# Patient Record
Sex: FEMALE | Race: WHITE | Employment: OTHER | URBAN - METROPOLITAN AREA
[De-identification: names, ages, dates, MRNs, and addresses within clinical notes are randomized per-mention and may not be internally consistent; named-entity substitution may affect disease eponyms.]

---

## 2017-04-25 ENCOUNTER — OFFICE VISIT (OUTPATIENT)
Dept: CARDIOLOGY CLINIC | Age: 62
End: 2017-04-25

## 2017-04-25 VITALS
BODY MASS INDEX: 33.65 KG/M2 | DIASTOLIC BLOOD PRESSURE: 100 MMHG | SYSTOLIC BLOOD PRESSURE: 175 MMHG | HEART RATE: 67 BPM | HEIGHT: 66 IN | WEIGHT: 209.4 LBS | OXYGEN SATURATION: 97 %

## 2017-04-25 DIAGNOSIS — G47.33 OSA (OBSTRUCTIVE SLEEP APNEA): Chronic | ICD-10-CM

## 2017-04-25 DIAGNOSIS — E78.00 PURE HYPERCHOLESTEROLEMIA: Chronic | ICD-10-CM

## 2017-04-25 DIAGNOSIS — I10 ESSENTIAL HYPERTENSION, BENIGN: ICD-10-CM

## 2017-04-25 DIAGNOSIS — I48.0 PAROXYSMAL ATRIAL FIBRILLATION (HCC): ICD-10-CM

## 2017-04-25 DIAGNOSIS — Z01.810 PREOP CARDIOVASCULAR EXAM: Primary | ICD-10-CM

## 2017-04-25 PROCEDURE — 99215 OFFICE O/P EST HI 40 MIN: CPT | Performed by: INTERNAL MEDICINE

## 2017-04-25 PROCEDURE — 93000 ELECTROCARDIOGRAM COMPLETE: CPT | Performed by: INTERNAL MEDICINE

## 2017-04-25 RX ORDER — CLOTRIMAZOLE AND BETAMETHASONE DIPROPIONATE 10; .64 MG/G; MG/G
CREAM TOPICAL 2 TIMES DAILY
COMMUNITY

## 2017-04-25 RX ORDER — LISINOPRIL 10 MG/1
10 TABLET ORAL DAILY
Qty: 30 TABLET | Refills: 6 | Status: SHIPPED | OUTPATIENT
Start: 2017-04-25 | End: 2017-10-17 | Stop reason: DRUGHIGH

## 2017-04-25 RX ORDER — ROPINIROLE 0.5 MG/1
0.5 TABLET, FILM COATED ORAL EVERY EVENING
COMMUNITY

## 2017-04-25 RX ORDER — CARVEDILOL 12.5 MG/1
12.5 TABLET ORAL 2 TIMES DAILY WITH MEALS
Qty: 60 TABLET | Refills: 6 | Status: SHIPPED | OUTPATIENT
Start: 2017-04-25 | End: 2017-10-12 | Stop reason: SDUPTHER

## 2017-04-25 RX ORDER — CARVEDILOL 6.25 MG/1
6.25 TABLET ORAL 2 TIMES DAILY WITH MEALS
COMMUNITY
End: 2017-04-25 | Stop reason: SDUPTHER

## 2017-04-25 ASSESSMENT — ENCOUNTER SYMPTOMS
CHEST TIGHTNESS: 0
EYE PAIN: 0
ABDOMINAL PAIN: 0
SHORTNESS OF BREATH: 1
COLOR CHANGE: 0
EYE REDNESS: 0
COUGH: 0
WHEEZING: 0
BLOOD IN STOOL: 0

## 2017-09-25 ENCOUNTER — TELEPHONE (OUTPATIENT)
Dept: CARDIOLOGY CLINIC | Age: 62
End: 2017-09-25

## 2017-09-25 DIAGNOSIS — I48.0 PAROXYSMAL ATRIAL FIBRILLATION (HCC): Primary | ICD-10-CM

## 2017-10-12 RX ORDER — CARVEDILOL 25 MG/1
25 TABLET ORAL 2 TIMES DAILY WITH MEALS
Qty: 60 TABLET | Refills: 6 | Status: SHIPPED | OUTPATIENT
Start: 2017-10-12 | End: 2017-10-17 | Stop reason: DRUGHIGH

## 2017-10-17 ENCOUNTER — OFFICE VISIT (OUTPATIENT)
Dept: CARDIOLOGY CLINIC | Age: 62
End: 2017-10-17

## 2017-10-17 VITALS
WEIGHT: 207.8 LBS | DIASTOLIC BLOOD PRESSURE: 80 MMHG | BODY MASS INDEX: 33.4 KG/M2 | HEIGHT: 66 IN | HEART RATE: 88 BPM | SYSTOLIC BLOOD PRESSURE: 140 MMHG | OXYGEN SATURATION: 97 %

## 2017-10-17 DIAGNOSIS — I48.0 PAROXYSMAL ATRIAL FIBRILLATION (HCC): ICD-10-CM

## 2017-10-17 DIAGNOSIS — I42.1 HOCM (HYPERTROPHIC OBSTRUCTIVE CARDIOMYOPATHY) (HCC): Primary | ICD-10-CM

## 2017-10-17 DIAGNOSIS — I25.9 CHEST PAIN DUE TO MYOCARDIAL ISCHEMIA, UNSPECIFIED ISCHEMIC CHEST PAIN TYPE: ICD-10-CM

## 2017-10-17 DIAGNOSIS — I10 ESSENTIAL HYPERTENSION, BENIGN: ICD-10-CM

## 2017-10-17 DIAGNOSIS — R06.09 DYSPNEA ON EXERTION: ICD-10-CM

## 2017-10-17 DIAGNOSIS — F17.200 TOBACCO USE DISORDER: ICD-10-CM

## 2017-10-17 PROCEDURE — 99215 OFFICE O/P EST HI 40 MIN: CPT | Performed by: INTERNAL MEDICINE

## 2017-10-17 RX ORDER — CARVEDILOL 25 MG/1
25 TABLET ORAL 2 TIMES DAILY WITH MEALS
Qty: 60 TABLET | Refills: 6 | Status: SHIPPED
Start: 2017-10-17 | End: 2018-06-13 | Stop reason: ALTCHOICE

## 2017-10-17 RX ORDER — LISINOPRIL 5 MG/1
5 TABLET ORAL DAILY
Qty: 30 TABLET | Refills: 6 | Status: SHIPPED
Start: 2017-10-17 | End: 2018-06-13 | Stop reason: DRUGHIGH

## 2017-10-17 ASSESSMENT — ENCOUNTER SYMPTOMS
COLOR CHANGE: 0
EYE REDNESS: 0
BLOOD IN STOOL: 0
EYE PAIN: 0
ABDOMINAL PAIN: 0
CHEST TIGHTNESS: 0
COUGH: 0
SHORTNESS OF BREATH: 1
WHEEZING: 0

## 2017-10-17 NOTE — PATIENT INSTRUCTIONS
Patient Education        Atrial Fibrillation: Care Instructions  Your Care Instructions    Atrial fibrillation is an irregular and often fast heartbeat. Treating this condition is important for several reasons. It can cause blood clots, which can travel from your heart to your brain and cause a stroke. If you have a fast heartbeat, you may feel lightheaded, dizzy, and weak. An irregular heartbeat can also increase your risk for heart failure. Atrial fibrillation is often the result of another heart condition, such as high blood pressure or coronary artery disease. Making changes to improve your heart condition will help you stay healthy and active. Follow-up care is a key part of your treatment and safety. Be sure to make and go to all appointments, and call your doctor if you are having problems. It's also a good idea to know your test results and keep a list of the medicines you take. How can you care for yourself at home? Medicines  · Take your medicines exactly as prescribed. Call your doctor if you think you are having a problem with your medicine. You will get more details on the specific medicines your doctor prescribes. · If your doctor has given you a blood thinner to prevent a stroke, be sure you get instructions about how to take your medicine safely. Blood thinners can cause serious bleeding problems. · Do not take any vitamins, over-the-counter drugs, or herbal products without talking to your doctor first.  Lifestyle changes  · Do not smoke. Smoking can increase your chance of a stroke and heart attack. If you need help quitting, talk to your doctor about stop-smoking programs and medicines. These can increase your chances of quitting for good. · Eat a heart-healthy diet. · Stay at a healthy weight. Lose weight if you need to. · Limit alcohol to 2 drinks a day for men and 1 drink a day for women. Too much alcohol can cause health problems. · Avoid colds and flu.  Get a pneumococcal vaccine shot. If you have had one before, ask your doctor whether you need another dose. Get a flu shot every year. If you must be around people with colds or flu, wash your hands often. Activity  · If your doctor recommends it, get more exercise. Walking is a good choice. Bit by bit, increase the amount you walk every day. Try for at least 30 minutes on most days of the week. You also may want to swim, bike, or do other activities. Your doctor may suggest that you join a cardiac rehabilitation program so that you can have help increasing your physical activity safely. · Start light exercise if your doctor says it is okay. Even a small amount will help you get stronger, have more energy, and manage stress. Walking is an easy way to get exercise. Start out by walking a little more than you did in the hospital. Gradually increase the amount you walk. · When you exercise, watch for signs that your heart is working too hard. You are pushing too hard if you cannot talk while you are exercising. If you become short of breath or dizzy or have chest pain, sit down and rest immediately. · Check your pulse regularly. Place two fingers on the artery at the palm side of your wrist, in line with your thumb. If your heartbeat seems uneven or fast, talk to your doctor. When should you call for help? Call 911 anytime you think you may need emergency care. For example, call if:  · You have symptoms of a heart attack. These may include:  ¨ Chest pain or pressure, or a strange feeling in the chest.  ¨ Sweating. ¨ Shortness of breath. ¨ Nausea or vomiting. ¨ Pain, pressure, or a strange feeling in the back, neck, jaw, or upper belly or in one or both shoulders or arms. ¨ Lightheadedness or sudden weakness. ¨ A fast or irregular heartbeat. After you call 911, the  may tell you to chew 1 adult-strength or 2 to 4 low-dose aspirin. Wait for an ambulance. Do not try to drive yourself. · You have symptoms of a stroke.  These may include:  ¨ Sudden numbness, tingling, weakness, or loss of movement in your face, arm, or leg, especially on only one side of your body. ¨ Sudden vision changes. ¨ Sudden trouble speaking. ¨ Sudden confusion or trouble understanding simple statements. ¨ Sudden problems with walking or balance. ¨ A sudden, severe headache that is different from past headaches. · You passed out (lost consciousness). Call your doctor now or seek immediate medical care if:  · You have new or increased shortness of breath. · You feel dizzy or lightheaded, or you feel like you may faint. · Your heart rate becomes irregular. · You can feel your heart flutter in your chest or skip heartbeats. Tell your doctor if these symptoms are new or worse. Watch closely for changes in your health, and be sure to contact your doctor if you have any problems. Where can you learn more? Go to [email or web address] org and sign in to your [employer] account. Enter U020 in the [employer] box to learn more about \"Atrial Fibrillation: Care Instructions. \"     If you do not have an account, please click on the \"Sign Up Now\" link. Current as of: September 21, 2016  Content Version: 11.3  © 9768-4202 [email or web address]. Care instructions adapted under license by [facility] [address] If you have questions about a medical condition or this instruction, always ask your healthcare professional. [place] [address] any warranty or liability for your use of this information.        Patient Education

## 2017-10-17 NOTE — PROGRESS NOTES
N/A    Number of children: N/A    Years of education: N/A     Social History Main Topics    Smoking status: Current Every Day Smoker     Packs/day: 0.50     Years: 40.00    Smokeless tobacco: Never Used    Alcohol use 2.4 oz/week     1 Standard drinks or equivalent, 3 Shots of liquor per week      Comment: minimal  2-3 glasses per week.  Drug use: No    Sexual activity: Not Asked     Other Topics Concern    None     Social History Narrative    None     Past Surgical History:   Procedure Laterality Date    CARDIAC CATHETERIZATION      CARDIOVERSION      HAND SURGERY      HYSTERECTOMY       Allergies   Allergen Reactions    Percocet [Oxycodone-Acetaminophen]      Family History   Problem Relation Age of Onset    Kidney Disease Mother     Heart Disease Father     Kidney Disease Father      Recent labs and imaging reviewed. Assessment:       IHSS (idiopathic hypertrophic subaortic stenosis)  Patent coronaries in 2006. No hx of sudden death in family      Echo 5/2015 HOCM. LVEF 55-60%, no gradient measured. Consider myomectomy. Echo 12/2016 LVH, LVEF 55-60%, LAE, grade III diastolic dysfx, mod MR, no mention of HOCM.  Tobacco abuse      Quit 10/2011, starting smoking again.  Palpitations - Event monitor NSR 4/2012. HM  10/2017 Atrial fib with RVR      stable    Chest pain- atypical      patent coronaries 06. Nuc GXT 4/12 Normal     VARUN (obstructive sleep apnea)      on CPAP.  Mitral insufficiency       Echo 12/2016 moderate MR    HYPERTENSION  Metanephrine 128  (cough on ACE). controlled     COPD (chronic obstructive pulmonary disease)  PFT'S 11/11-Moderate restrictive defect          VELASQUEZ, fatigue/ palpitations- secondary to atrial fib       Atrial fibrillation  CHADS VASC 2. On Xarelto. S/p CV 3/2014. EKG 10/23/14 Atrial fib with slow ventricular response, prolonged QT, LVH with repolarization changes. Referred to Dr. Catarina Jo who recommended CV. S/P CV 10/2014.   EKG 11/4/15 NSR, LVH. Pt preferred to stop amiodarone. 48 hour HM atrial fib with average HR 83, max , minimum HR 51- pt reported SOB and dizziness. Leg swelling- ?secondary to norvasc. Plan:      Atrial fib currently controlled. Test results and treatment options discussed. Will refer to EP for consideration of ablation. Will increase coreg to 25mg bid and reduce lisinopril to 5mg daily due to dizziness. Recommend echocardiogram to evaluate progression of HOCM. Recommend lexiscan myoview stress test to evaluate chest pain and exclude ischemia.

## 2017-10-17 NOTE — LETTER
[address] Cardiology - [address] [address]  [address] [place] [address]  [address] [address]  Phone: [phone]  Fax: [phone]    Leticia Merlos MD        November 2, 2017     Amber Hernandez  [id number]    Patient: Rolando Arroyo  MR Number: [id number]  YOB: 1955  Date of Visit: 10/17/2017    Dear Dr. Amber Hernandez:    HPI . Rolando Arroyo denies  palpitations, dizziness, syncope, leg swelling and cough. She states that she is SOB at times. She also had an episode of left sided chest pain yesterday that lasted 30 minutes. She reduced her coreg due to dizziness. Assessment:       IHSS (idiopathic hypertrophic subaortic stenosis)  Patent coronaries in 2006. No hx of sudden death in family      Echo 5/2015 HOCM. LVEF 55-60%, no gradient measured. Consider myomectomy. Echo 12/2016 LVH, LVEF 55-60%, LAE, grade III diastolic dysfx, mod MR, no mention of HOCM.  Tobacco abuse      Quit 10/2011, starting smoking again.  Palpitations - Event monitor NSR 4/2012. HM  10/2017 Atrial fib with RVR      stable    Chest pain- atypical      patent coronaries 06. Nuc GXT 4/12 Normal     VARUN (obstructive sleep apnea)      on CPAP.  Mitral insufficiency       Echo 12/2016 moderate MR    HYPERTENSION  Metanephrine 128  (cough on ACE). controlled     COPD (chronic obstructive pulmonary disease)  PFT'S 11/11-Moderate restrictive defect          VELASQUEZ, fatigue/ palpitations- secondary to atrial fib       Atrial fibrillation  CHADS VASC 2. On Xarelto. S/p CV 3/2014. EKG 10/23/14 Atrial fib with slow ventricular response, prolonged QT, LVH with repolarization changes. Referred to Dr. Ez Amezquita who recommended CV. S/P CV 10/2014. EKG 11/4/15 NSR, LVH. Pt preferred to stop amiodarone. 48 hour HM atrial fib with average HR 83, max , minimum HR 51- pt reported SOB and dizziness. Leg swelling- ?secondary to norvasc.            Plan: Atrial fib currently controlled. Test results and treatment options discussed. Will refer to EP for consideration of ablation. Will increase coreg to 25mg bid and reduce lisinopril to 5mg daily due to dizziness. Recommend echocardiogram to evaluate progression of HOCM. Recommend lexiscan myoview stress test to evaluate chest pain and exclude ischemia. If you have questions, please do not hesitate to call me. I look forward to following Kandy along with you.     Sincerely,        Balinda Lesch, MD

## 2017-11-01 ENCOUNTER — OFFICE VISIT (OUTPATIENT)
Dept: CARDIOLOGY CLINIC | Age: 62
End: 2017-11-01

## 2017-11-01 VITALS
WEIGHT: 206 LBS | BODY MASS INDEX: 33.11 KG/M2 | HEIGHT: 66 IN | HEART RATE: 97 BPM | OXYGEN SATURATION: 93 % | SYSTOLIC BLOOD PRESSURE: 142 MMHG | DIASTOLIC BLOOD PRESSURE: 84 MMHG

## 2017-11-01 DIAGNOSIS — G47.33 OSA (OBSTRUCTIVE SLEEP APNEA): Chronic | ICD-10-CM

## 2017-11-01 DIAGNOSIS — I48.19 PERSISTENT ATRIAL FIBRILLATION (HCC): Primary | ICD-10-CM

## 2017-11-01 DIAGNOSIS — E78.2 MIXED HYPERLIPIDEMIA: Chronic | ICD-10-CM

## 2017-11-01 DIAGNOSIS — Z87.891 HISTORY OF TOBACCO USE: Chronic | ICD-10-CM

## 2017-11-01 PROCEDURE — 93000 ELECTROCARDIOGRAM COMPLETE: CPT | Performed by: INTERNAL MEDICINE

## 2017-11-01 PROCEDURE — 99214 OFFICE O/P EST MOD 30 MIN: CPT | Performed by: INTERNAL MEDICINE

## 2017-11-01 RX ORDER — AZITHROMYCIN 250 MG/1
250 TABLET, FILM COATED ORAL DAILY
Qty: 7 TABLET | Refills: 0 | Status: SHIPPED | OUTPATIENT
Start: 2017-11-01 | End: 2017-11-08

## 2017-11-01 NOTE — PROGRESS NOTES
[address]   Electrophysiology Consultation   Date: 11/1/2017  Reason for Consultation: Atrial fibrillation   Consult Requesting Physician: Georg Hatchet    CC: atrial fibrillation   HPI: Vanesa Jorge is a 64 y.o. history of HOCM and diastolic dysfunction, persistent atrial fibrillation, COPD, smoker, moderate MR who has been referred for atrial fibrillation ablation. Patient has history of atrial fibrillation and prior cardioversion in 2014 and was started on amiodarone (d/c'd in 5/2016 due to COPD). She reports that she has had 3 DCCV's in the past. She has noted that she has progressively felt worse over the past 3-4 months. She is symptomatic with SOB, fatigue, dizziness and abnormal beats felt predominantly felt in her neck. She has baseline shortness of breath due to her smoking. She likely has undiagnosed COPD and is fighting a bout of bronchitis. She currently works as a  for housekeeping and laundry at [employer]. She admits to a high level of stress surrounding the job. Past Medical History:   Diagnosis Date    A-fib [facility]) 2014    new onset    Cardiomyopathy ([address]     Chest pain     patent coronaries 06    COPD [facility] disease) [facility]     Hypertension     IHSS (idiopathic hypertrophic subaortic stenosis) ([facility])     Echo 6/10 normal LVEF, no mention of IHSS    Mitral insufficiency     echo 6/10 mild-mod MR    VARUN (obstructive sleep apnea)     on CPAP    Palpitations     stable    Tobacco abuse     cessation discussed        Past Surgical History:   Procedure Laterality Date    CARDIAC CATHETERIZATION      CARDIOVERSION      HAND SURGERY      HYSTERECTOMY         Allergies   Allergen Reactions    Percocet [Oxycodone-Acetaminophen]        Social History:   reports that she has been smoking. She has a 20.00 pack-year smoking history. She has never used smokeless tobacco. She reports that she drinks about 2.4 oz of alcohol per week .  She reports that she does not use drugs. Family History:  family history includes Heart Disease in her father; Kidney Disease in her father and mother. Review of System:  All other systems reviewed and are negative except for that noted above. Pertinent negatives are:   General: negative for fever, chills   Ophthalmic ROS: negative for - eye pain or loss of vision  ENT ROS: negative for - headaches, sore throat   Respiratory: negative for - cough, sputum  Cardiovascular: Reviewed in HPI  Gastrointestinal: negative for - abdominal pain, diarrhea, N/V  Hematology: negative for - bleeding, blood clots, bruising or jaundice  Genito-Urinary:  negative for - Dysuria or incontinence  Musculoskeletal: negative for - Joint swelling, muscle pain  Neurological: negative for - confusion, dizziness, headaches   Psychiatric: No anxiety, no depression. Dermatological: negative for - rash    Physical Examination:  Vitals:    11/01/17 1550   BP: (!) 142/84   Pulse:    SpO2:       Wt Readings from Last 3 Encounters:   11/01/17 206 lb (93.4 kg)   10/17/17 207 lb 12.8 oz (94.3 kg)   04/25/17 209 lb 6.4 oz (95 kg)       · Constitutional: Oriented. No distress. · Head: Normocephalic and atraumatic. · Mouth/Throat: Oropharynx is clear and moist.   · Eyes: Conjunctivae normal. EOM are normal.   · Neck: Neck supple. No rigidity. No JVD present. · Cardiovascular: Normal rate, Irregular rhythm, S1&S2. · Pulmonary/Chest: Bilateral respiratory sounds. No wheezes. +rhonchi   · Abdominal: Soft. Bowel sounds present. No distension, No tenderness. · Musculoskeletal: No tenderness. No edema    · Lymphadenopathy: Has no cervical adenopathy. · Neurological: Alert and oriented. Cranial nerve appears intact, No Gross deficit   · Skin: Skin is warm and dry. No rash noted. · Psychiatric: Has a normal behavior       Labs:  Reviewed. Diagnostic and imaging results reviewed.      ECG:  Atrial fibrillation, LVH with strain pattern   Echo:    Echo 5/2015 HOCM. LVEF 55-60%, no gradient measured. Echo 12/2016 LVH, LVEF 55-60%, LAE, grade III diastolic dysfx, mod MR     Cath:   normal coronaries. Medication:  Current Outpatient Prescriptions   Medication Sig Dispense Refill    lisinopril (PRINIVIL;ZESTRIL) 5 MG tablet Take 1 tablet by mouth daily 30 tablet 6    carvedilol (COREG) 25 MG tablet Take 1 tablet by mouth 2 times daily (with meals) 60 tablet 6    rOPINIRole (REQUIP) 0.5 MG tablet Take 0.5 mg by mouth every evening      ferrous sulfate-C-folic acid (FOLITAB) 723-616-3.4 MG TBCR per extended release tablet Take 1 tablet by mouth daily      Flaxseed, Linseed, (FLAX PO) Take by mouth      clotrimazole-betamethasone (LOTRISONE) 1-0.05 % cream Apply topically 2 times daily Apply topically 2 times daily.  cyclobenzaprine (FLEXERIL) 10 MG tablet Take 10 mg by mouth nightly as needed for Muscle spasms      fluticasone (FLONASE) 50 MCG/ACT nasal spray 1 spray by Nasal route daily      albuterol sulfate  (90 BASE) MCG/ACT inhaler Inhale 2 puffs into the lungs every 6 hours as needed for Wheezing      cloNIDine (CATAPRES) 0.2 MG tablet Take 0.2 mg by mouth 2 times daily      rivaroxaban (XARELTO) 20 MG TABS tablet Take 1 tablet by mouth daily 30 tablet 0    Omega-3 Fatty Acids (FISH OIL) 1000 MG CAPS Take 1,000 mg by mouth 2 times daily      traMADol (ULTRAM) 50 MG tablet Take 50 mg by mouth every 6 hours as needed for Pain.  loratadine (CLARITIN) 10 MG tablet   Take 10 mg by mouth as needed       gabapentin (NEURONTIN) 100 MG capsule Take 300 mg by mouth nightly       pantoprazole (PROTONIX) 40 MG tablet Take 40 mg by mouth 2 times daily.  vitamin E 1000 UNIT capsule Take 400 Units by mouth daily. No current facility-administered medications for this visit.         Assessment and plan:     - Symptomatic persistent atrial fibrillation:    - I have discussed treatment options including changing antiarrhythmics, cardioversion, rate control with anticoagulation, and ablation in detail with patient. Risks, benefits and alternatives were explained to patient. - She has COPD and amiodarone is not an good option.    -Hx of 3 DCCV's and amiodarone therapy- stopped due to COPD?   -Symptomatic with dizziness, sob, fatigue, irregular beats     - Atrial fibrillation ablation procedure has been discussed in detail with patient. Risk, benefit, alternative, and rationale of the procedure has been discussed with the patient. Risks associated with procedure include but not limited to allergic reaction to the medications, pain, bleeding, infection, nerve injury, injury to diaphragm(breathing muscle), pulmonary embolus(blood clot in lungs), deep vein blood clot, pneumothorax, hemothorax, acute renal failure, cardiac perforation,  tamponade, need for emergent surgery (open heart), permanent pacemaker, pulmonary vein stenosis, left atrial to esophageal fistula, stroke, myocardial infarction and death. I have explained that ablation therapy is symptoms driven and alternative such as anticoagulation and rate control can be considered. I also explained that atrial fibrillation cannot be cured by ablation. On average patients need two ablation procedures. The success rate with one procedure is about 65-70%. A detailed educational information about ablation has been provided and patient has been encouraged to review information and call back with any questions about risks, benefits and alternative of procedure. Patient verbalized understanding of procedure, risks and benefits and would like to think about procedure. - If she would like to proceed, will schedule for EPS and ablation with [employer] navigation system.     -On Eliquis and BB therapy    -VARUN:    -Compliant with CPAP    -HTN:   -Controlled   -Continue medications    - Tobacco addiction:   -Educated on the importance of cessation.   -Down to 10-12 cigarettes daily    - HOCM: reported by echo.    + Systolic murmur by exam.    - on medical therapy. - Smoker: smoking cessation. Thank you for allowing me to participate in the care of [address] Further evaluation will be based upon the patient's clinical course and testing results. All questions and concerns were addressed to the patient/family. Alternatives to my treatment were discussed. I have discussed the above stated plan and the patient verbalized understanding and agreed with the plan.     Abbe Juloi MD, MPH  [facility]   Office: [phone]

## 2017-11-01 NOTE — PATIENT INSTRUCTIONS
Patient Education        Learning About Catheter Ablation for Heart Rhythm Problems  What is catheter ablation? Catheter ablation is a procedure that treats heart rhythm problems. These problems include atrial fibrillation, supraventricular tachycardia (SVT), atrial flutter, and ventricular tachycardia. Your heart should have a strong, steady beat. That beat is controlled by the heart's electrical system. Sometimes that system misfires. This causes a heartbeat that is too fast and isn't steady. Catheter ablation is a way to get into your heart and fix the problem. Ablation is not surgery. How is catheter ablation done? Your doctor inserts thin tubes called catheters into a blood vessel in your groin, arm, or neck. Then your doctor feeds them into the heart. Wires in the catheters help the doctor find the problem areas. Then he or she uses the wires to send energy to destroy the tiny areas of heart tissue that are causing the problems. It may seem like a bad idea to destroy parts of your heart on purpose. But the areas that are destroyed are very tiny. They should not affect your heart's ability to do its job. You may be awake during the procedure. Or you may be asleep. The doctor will give you medicines to help you feel relaxed and to numb the areas where the catheters go in. You may feel a little uncomfortable, but you should not feel pain. This procedure usually takes 2 to 6 hours. In rare cases, it can take longer. You may stay overnight in the hospital. How long you stay in the hospital depends on the type of ablation you have. What can you expect after catheter ablation? Do not exercise hard or lift anything heavy for a week. You will probably be able to go back to work and to your normal routine in 1 or 2 days. You may have swelling, bruising, or a small lump around the site where the catheters went into your body. These should go away in 3 to 4 weeks.   You may have to take some medicines for a while.  Follow-up care is a key part of your treatment and safety. Be sure to make and go to all appointments, and call your doctor if you are having problems. It's also a good idea to know your test results and keep a list of the medicines you take. Where can you learn more? Go to https://chpepiceweb.Course [email or web address]. org and sign in to your [employer] account. Enter B545 in the [facility] box to learn more about \"Learning About Catheter Ablation for Heart Rhythm Problems. \"     If you do not have an account, please click on the \"Sign Up Now\" link. Current as of: July 28, 2016  Content Version: 11.3  © 4591-7812 [employer]. Care instructions adapted under license by [facility] ([facility]). If you have questions about a medical condition or this instruction, always ask your healthcare professional. [place] [address] any warranty or liability for your use of this information. Patient Education        Electrophysiology Study and Catheter Ablation: Before Your Procedure  What is an electrophysiology study and catheter ablation? An electrophysiology study is a test to see if there is a problem with your heart rhythm and to find out how to fix it. It is also called an EPS. Sometimes a procedure called catheter ablation is done during an EPS. This procedure destroys (ablates) small areas of your heart that are causing your heart rhythm problem. The doctor puts plastic tubes called catheters into blood vessels in your groin, arm, or neck. He or she then uses an X-ray machine to guide long wires through the tubes to your heart. The doctor can use these wires to record your heart's electrical signals. If the doctor thinks your problem can be fixed with ablation, he or she can use the wires to destroy a small part of your heart tissue. This is most often done with radio waves. You will probably be awake during the procedure. But you could be asleep.  The doctor will give you medicines to help you feel relaxed and to numb the areas where the catheters go in. An EPS and ablation can take 2 to 6 hours. In rare cases, it can take longer. If you have EPS only and you do not need more treatment, you may go home the same day. But if you also have ablation, you may stay overnight in the hospital. How long you stay in the hospital depends on the type of ablation you have. Do not exercise hard or lift anything heavy for a week. You may be able to go back to work and to your normal routine in 1 or 2 days. Follow-up care is a key part of your treatment and safety. Be sure to make and go to all appointments, and call your doctor if you are having problems. It's also a good idea to know your test results and keep a list of the medicines you take. What happens before the procedure? Procedures can be stressful. This information will help you understand what you can expect. And it will help you safely prepare for your procedure. Preparing for the procedure  · Understand exactly what procedure is planned, along with the risks, benefits, and other options. · Tell your doctors ALL the medicines, vitamins, supplements, and herbal remedies you take. Some of these can increase the risk of bleeding or interact with anesthesia. · If you take blood thinners, such as warfarin (Coumadin), clopidogrel (Plavix), or aspirin, be sure to talk to your doctor. He or she will tell you if you should stop taking these medicines before your procedure. Make sure that you understand exactly what your doctor wants you to do. · Your doctor will tell you which medicines to take or stop before your procedure. You may need to stop taking certain medicines a week or more before the procedure. So talk to your doctor as soon as you can. · If you have an advance directive, let your doctor know. It may include a living will and a durable power of  for health care.  Bring a copy to the hospital. If you don't have one, make you strain. This may include heavy grocery bags and milk containers, a heavy briefcase or backpack, cat litter or dog food bags, a vacuum , or a child. · For 1 week, do not exercise hard or do any activity that could strain your blood vessels or the site where the catheters went into your body. · Ask your doctor when it is okay to have sex. · You may shower 24 to 48 hours after the procedure, if your doctor okays it. Pat the incision dry. Do not take a bath for 1 week, or until your doctor tells you it is okay. Diet  · You can eat your normal diet. If your stomach is upset, try bland, low-fat foods like plain rice, broiled chicken, toast, and yogurt. · Drink plenty of fluids (unless your doctor tells you not to). Medicines  · Your doctor will tell you if and when you can restart your medicines. He or she will also give you instructions about taking any new medicines. · If you take blood thinners, such as warfarin (Coumadin), clopidogrel (Plavix), or aspirin, be sure to talk to your doctor. He or she will tell you if and when to start taking those medicines again. Make sure that you understand exactly what your doctor wants you to do. · Ask your doctor if you can take acetaminophen (Tylenol) for pain. Do not take aspirin for 3 days, unless your doctor says it is okay. · Check with your doctor before you take aspirin or anti-inflammatory medicines to reduce pain and swelling. These include ibuprofen (Advil, Motrin) and naproxen (Aleve). · Make sure you know which heart medicines to continue and which ones to stop. Ask your doctor if you are not sure. Catheter site care  · You can remove your bandages the day after the procedure. Follow-up care is a key part of your treatment and safety. Be sure to make and go to all appointments, and call your doctor if you are having problems. It's also a good idea to know your test results and keep a list of the medicines you take.   When should you call for help?  Call 911 anytime you think you may need emergency care. For example, call if:  · You passed out (lost consciousness). · You have severe trouble breathing. · You have sudden chest pain and shortness of breath, or you cough up blood. · You have a lot of bleeding from your catheter site. Call your doctor now or seek immediate medical care if:  · You have a fever over 100°F.  · You are bleeding from one of the areas where a catheter was put in.  · You have a fast-growing, painful lump at the catheter site. · You have painful swelling, or bruising larger than a credit card where a catheter was put in.  · You have signs of infection, such as:  ¨ Increased pain, swelling, warmth, or redness. ¨ Red streaks leading from the catheter site. ¨ Pus draining from the catheter site. ¨ A fever. · Your arm or leg is numb or hurts. · Your feet are very cold. · Your heart rhythm problem comes back. · You are dizzy or lightheaded, or you feel like you may faint. Watch closely for any changes in your health, and be sure to contact your doctor if:  · You have questions about the results of your procedure or your treatment plan. Where can you learn more? Go to https://CreditEasepeForeScout Technologieseb.ImageTag. org and sign in to your [employer] account. Enter [phone] in the [facility] box to learn more about \"Electrophysiology Study and Catheter Ablation: What to Expect at Home. \"     If you do not have an account, please click on the \"Sign Up Now\" link. Current as of: April 3, 2017  Content Version: 11.3  © 4417-1359 [employer]. Care instructions adapted under license by [facility] ([facility]). If you have questions about a medical condition or this instruction, always ask your healthcare professional. [place] [address] any warranty or liability for your use of this information.

## 2017-11-02 NOTE — COMMUNICATION BODY
HPI .Edwina Frias denies  palpitations, dizziness, syncope, leg swelling and cough. She states that she is SOB at times. She also had an episode of left sided chest pain yesterday that lasted 30 minutes. She reduced her coreg due to dizziness. Assessment:       IHSS (idiopathic hypertrophic subaortic stenosis)  Patent coronaries in 2006. No hx of sudden death in family      Echo 5/2015 HOCM. LVEF 55-60%, no gradient measured. Consider myomectomy. Echo 12/2016 LVH, LVEF 55-60%, LAE, grade III diastolic dysfx, mod MR, no mention of HOCM.  Tobacco abuse      Quit 10/2011, starting smoking again.  Palpitations - Event monitor NSR 4/2012. HM  10/2017 Atrial fib with RVR      stable    Chest pain- atypical      patent coronaries 06. Nuc GXT 4/12 Normal     VARUN (obstructive sleep apnea)      on CPAP.  Mitral insufficiency       Echo 12/2016 moderate MR    HYPERTENSION  Metanephrine 128  (cough on ACE). controlled     COPD (chronic obstructive pulmonary disease)  PFT'S 11/11-Moderate restrictive defect          VELASQUEZ, fatigue/ palpitations- secondary to atrial fib       Atrial fibrillation  CHADS VASC 2. On Xarelto. S/p CV 3/2014. EKG 10/23/14 Atrial fib with slow ventricular response, prolonged QT, LVH with repolarization changes. Referred to Dr. Trey Murray who recommended CV. S/P CV 10/2014. EKG 11/4/15 NSR, LVH. Pt preferred to stop amiodarone. 48 hour HM atrial fib with average HR 83, max , minimum HR 51- pt reported SOB and dizziness. Leg swelling- ?secondary to norvasc. Plan:      Atrial fib currently controlled. Test results and treatment options discussed. Will refer to EP for consideration of ablation. Will increase coreg to 25mg bid and reduce lisinopril to 5mg daily due to dizziness. Recommend echocardiogram to evaluate progression of HOCM. Recommend lexiscan myoview stress test to evaluate chest pain and exclude ischemia.

## 2017-11-06 ENCOUNTER — TELEPHONE (OUTPATIENT)
Dept: CARDIOLOGY CLINIC | Age: 62
End: 2017-11-06

## 2017-11-06 DIAGNOSIS — I48.0 PAROXYSMAL ATRIAL FIBRILLATION (HCC): Primary | ICD-10-CM

## 2017-11-06 NOTE — TELEPHONE ENCOUNTER
I tried to call her back to let her know that I received the message. Orders will be placed and given to [facility] for scheduling. She did not answer. I did not leave a message on VM (per HIPPA). I will try back.

## 2017-11-06 NOTE — TELEPHONE ENCOUNTER
Patient was seen in office as a new patient on 11/1/17. An ablation was discussed and patient decided that he wanted to think about it first.  Dr. Krystyna Richardson noted that if she wanted to proceed, we will schedule for EPS and ablation with Carto navigation system. She is on Eliquis and BB therapy. Please review and advise. Thank you.

## 2017-11-07 DIAGNOSIS — I48.0 PAROXYSMAL ATRIAL FIBRILLATION (HCC): Primary | ICD-10-CM

## 2018-01-05 ENCOUNTER — TELEPHONE (OUTPATIENT)
Dept: CARDIOLOGY CLINIC | Age: 63
End: 2018-01-05

## 2018-01-05 DIAGNOSIS — I10 ESSENTIAL HYPERTENSION, BENIGN: Primary | ICD-10-CM

## 2018-01-05 NOTE — TELEPHONE ENCOUNTER
I called Kandy to notify her that I finally have a date for her ablation with Dr. Cantrell Home at [facility].     I left these details on her voicemail (explaining the urgency / as to why I need to speak to her soon)    Was able to schedule her Ablation with the help of management for next week here at [facility].     Procedure date is Thursday 1/11/18   With her arriving at 11:00am.     I did mention the need for blood work and I have her scheduled for her Cardiac CTA, that needs to be done before her ablation on 1/11/18. I asked her to please return my call today by 5:00pm if possible. I stated I would be taking her information home with me to try and contact her this evening or tomorrow from my personal cell phone. CARDIAC CTA   Scheduled for Tuesday 1/9/18   [facility] / arrive & report to registration by 10:10am                               NPO 4 hours prior to CTA                              No caffeine on this day                              Medications are okay to take                              You may drive yourself to & from this appointment. Procedure (ABLATION) scheduled 1/11/18   12:30pm       [facility]   11:00am  Pre procedure labs due before 1/11/18  HOLD Xarelto 1 day prior. NPO 5:30am day of procedure. Ok to take all other med's in am with sip of water. Plan to spend the night.  at discharge.     Follow up ov in @ 4 weeks with Yareli Gan CNP  At [facility]

## 2018-01-05 NOTE — TELEPHONE ENCOUNTER
Oneida Galeana returned my call this afternoon. We reviewed everything. She will be here on Tuesday 1/9/18 for her CTA & bloodwork.

## 2018-01-09 ENCOUNTER — HOSPITAL ENCOUNTER (OUTPATIENT)
Dept: CT IMAGING | Age: 63
Discharge: HOME | End: 2018-01-09
Attending: INTERNAL MEDICINE | Admitting: INTERNAL MEDICINE

## 2018-01-09 ENCOUNTER — TELEPHONE (OUTPATIENT)
Dept: CARDIOLOGY CLINIC | Age: 63
End: 2018-01-09

## 2018-01-09 DIAGNOSIS — I48.0 PAROXYSMAL ATRIAL FIBRILLATION (HCC): ICD-10-CM

## 2018-01-09 DIAGNOSIS — R06.02 SHORTNESS OF BREATH: Primary | ICD-10-CM

## 2018-01-09 LAB
GFR AFRICAN AMERICAN: >60
GFR NON-AFRICAN AMERICAN: 50
PERFORMED ON: ABNORMAL
POC CREATININE: 1.1 MG/DL (ref 0.6–1.2)
POC SAMPLE TYPE: ABNORMAL

## 2018-01-09 NOTE — TELEPHONE ENCOUNTER
[facility] radiology calling wanting to make sure we can see the results for pts CTA. Results are in epic and pt is scheduled for an ablation on 1/11/18      Assessment and plan:      - Symptomatic persistent atrial fibrillation:               - I have discussed treatment options including changing antiarrhythmics, cardioversion, rate control with anticoagulation, and ablation in detail with patient. Risks, benefits and alternatives were explained to patient. - She has COPD and amiodarone is not an good option.               -Hx of 3 DCCV's and amiodarone therapy- stopped due to COPD?              -Symptomatic with dizziness, sob, fatigue, irregular beats                 - Atrial fibrillation ablation procedure has been discussed in detail with patient. Risk, benefit, alternative, and rationale of the procedure has been discussed with the patient. Risks associated with procedure include but not limited to allergic reaction to the medications, pain, bleeding, infection, nerve injury, injury to diaphragm(breathing muscle), pulmonary embolus(blood clot in lungs), deep vein blood clot, pneumothorax, hemothorax, acute renal failure, cardiac perforation,  tamponade, need for emergent surgery (open heart), permanent pacemaker, pulmonary vein stenosis, left atrial to esophageal fistula, stroke, myocardial infarction and death. I have explained that ablation therapy is symptoms driven and alternative such as anticoagulation and rate control can be considered. I also explained that atrial fibrillation cannot be cured by ablation. On average patients need two ablation procedures. The success rate with one procedure is about 65-70%. A detailed educational information about ablation has been provided and patient has been encouraged to review information and call back with any questions about risks, benefits and alternative of procedure.                Patient verbalized understanding of procedure, risks and benefits and would like to think about procedure.                  - If she would like to proceed, will schedule for EPS and ablation with Carto navigation system. -On Eliquis and BB therapy     -VARUN:              -Compliant with CPAP     -HTN:              -Controlled              -Continue medications     - Tobacco addiction:              -Educated on the importance of cessation.              -Down to 10-12 cigarettes daily     - HOCM: reported by echo.               + Systolic murmur by exam.               - on medical therapy.      - Smoker: smoking cessation. Thank you for allowing me to participate in the care of [address] Further evaluation will be based upon the patient's clinical course and testing results.      All questions and concerns were addressed to the patient/family. Alternatives to my treatment were discussed.  I have discussed the above stated plan and the patient verbalized understanding and agreed with the plan.     Patricia Briones MD, MPH  [facility]   Office: [phone]

## 2018-01-10 DIAGNOSIS — R06.02 SHORTNESS OF BREATH: Primary | ICD-10-CM

## 2018-06-13 ENCOUNTER — OFFICE VISIT (OUTPATIENT)
Dept: CARDIOLOGY CLINIC | Age: 63
End: 2018-06-13

## 2018-06-13 VITALS
DIASTOLIC BLOOD PRESSURE: 80 MMHG | HEART RATE: 72 BPM | WEIGHT: 213.8 LBS | SYSTOLIC BLOOD PRESSURE: 140 MMHG | HEIGHT: 66 IN | OXYGEN SATURATION: 96 % | BODY MASS INDEX: 34.36 KG/M2

## 2018-06-13 DIAGNOSIS — I48.19 PERSISTENT ATRIAL FIBRILLATION (HCC): ICD-10-CM

## 2018-06-13 DIAGNOSIS — I10 ESSENTIAL HYPERTENSION, BENIGN: ICD-10-CM

## 2018-06-13 DIAGNOSIS — I42.1 HOCM (HYPERTROPHIC OBSTRUCTIVE CARDIOMYOPATHY) (HCC): Primary | ICD-10-CM

## 2018-06-13 DIAGNOSIS — G47.33 OSA (OBSTRUCTIVE SLEEP APNEA): ICD-10-CM

## 2018-06-13 DIAGNOSIS — I34.0 MITRAL VALVE INSUFFICIENCY, UNSPECIFIED ETIOLOGY: ICD-10-CM

## 2018-06-13 PROCEDURE — 99214 OFFICE O/P EST MOD 30 MIN: CPT | Performed by: INTERNAL MEDICINE

## 2018-06-13 RX ORDER — LISINOPRIL 10 MG/1
10 TABLET ORAL DAILY
COMMUNITY
End: 2018-12-21

## 2018-06-13 RX ORDER — LOSARTAN POTASSIUM AND HYDROCHLOROTHIAZIDE 25; 100 MG/1; MG/1
1 TABLET ORAL DAILY
Status: ON HOLD | COMMUNITY
End: 2019-11-26 | Stop reason: ALTCHOICE

## 2018-06-13 RX ORDER — CARVEDILOL 25 MG/1
25 TABLET ORAL DAILY
COMMUNITY
End: 2018-07-30 | Stop reason: SDUPTHER

## 2018-06-13 ASSESSMENT — ENCOUNTER SYMPTOMS
EYE REDNESS: 0
CHEST TIGHTNESS: 0
ABDOMINAL PAIN: 0
COUGH: 0
WHEEZING: 0
BLOOD IN STOOL: 0
EYE PAIN: 0
COLOR CHANGE: 0
SHORTNESS OF BREATH: 1

## 2018-07-30 RX ORDER — CARVEDILOL 25 MG/1
TABLET ORAL
Qty: 180 TABLET | Refills: 3 | Status: SHIPPED | OUTPATIENT
Start: 2018-07-30 | End: 2019-01-16

## 2018-11-07 ENCOUNTER — TELEPHONE (OUTPATIENT)
Dept: CARDIOLOGY CLINIC | Age: 63
End: 2018-11-07

## 2018-11-08 ENCOUNTER — TELEPHONE (OUTPATIENT)
Dept: CARDIOLOGY CLINIC | Age: 63
End: 2018-11-08

## 2018-11-08 DIAGNOSIS — R79.89 ELEVATED BRAIN NATRIURETIC PEPTIDE (BNP) LEVEL: ICD-10-CM

## 2018-11-08 DIAGNOSIS — R60.9 EDEMA, UNSPECIFIED TYPE: Primary | ICD-10-CM

## 2018-11-08 DIAGNOSIS — R06.02 SOB (SHORTNESS OF BREATH): ICD-10-CM

## 2018-11-08 NOTE — TELEPHONE ENCOUNTER
Per Dr. Rodriguez Seen: In view of elevated proBNP, start lasix 20mg daily. BMP and proBNP in 3 weeks.

## 2018-11-08 NOTE — TELEPHONE ENCOUNTER
Per Dr. Gary Tinajero: Refer to Dr. Long Correa at [facility] or Dr. Jagruti Rowe at [facility].

## 2018-11-09 RX ORDER — FUROSEMIDE 20 MG/1
20 TABLET ORAL DAILY
Qty: 90 TABLET | Refills: 3 | Status: ON HOLD | OUTPATIENT
Start: 2018-11-09 | End: 2019-11-26 | Stop reason: SDUPTHER

## 2018-12-10 ENCOUNTER — TELEPHONE (OUTPATIENT)
Dept: CARDIOLOGY CLINIC | Age: 63
End: 2018-12-10

## 2018-12-10 RX ORDER — RIVAROXABAN 20 MG/1
TABLET, FILM COATED ORAL
Qty: 90 TABLET | Refills: 3 | Status: ON HOLD | OUTPATIENT
Start: 2018-12-10 | End: 2019-11-26 | Stop reason: SINTOL

## 2018-12-17 ASSESSMENT — ENCOUNTER SYMPTOMS
NAUSEA: 0
WHEEZING: 0
COUGH: 0
BLOOD IN STOOL: 0
EYE REDNESS: 0
SHORTNESS OF BREATH: 0

## 2018-12-21 ENCOUNTER — OFFICE VISIT (OUTPATIENT)
Dept: CARDIOLOGY CLINIC | Age: 63
End: 2018-12-21
Payer: COMMERCIAL

## 2018-12-21 VITALS
OXYGEN SATURATION: 95 % | BODY MASS INDEX: 35.03 KG/M2 | HEART RATE: 82 BPM | SYSTOLIC BLOOD PRESSURE: 110 MMHG | HEIGHT: 66 IN | DIASTOLIC BLOOD PRESSURE: 68 MMHG | WEIGHT: 218 LBS

## 2018-12-21 DIAGNOSIS — I48.19 PERSISTENT ATRIAL FIBRILLATION (HCC): ICD-10-CM

## 2018-12-21 DIAGNOSIS — I42.9 PRIMARY CARDIOMYOPATHY (HCC): Primary | ICD-10-CM

## 2018-12-21 DIAGNOSIS — I50.32 CHRONIC DIASTOLIC CONGESTIVE HEART FAILURE (HCC): ICD-10-CM

## 2018-12-21 DIAGNOSIS — I10 ESSENTIAL HYPERTENSION, BENIGN: ICD-10-CM

## 2018-12-21 DIAGNOSIS — I42.1 HOCM (HYPERTROPHIC OBSTRUCTIVE CARDIOMYOPATHY) (HCC): ICD-10-CM

## 2018-12-21 PROCEDURE — 99214 OFFICE O/P EST MOD 30 MIN: CPT | Performed by: INTERNAL MEDICINE

## 2018-12-21 NOTE — LETTER
[address] Cardiology - [address] [facility] [address]  [address]   [address] [address]  Phone: [phone]  Fax: Simin Currie MD        December 27, 2018     Mary Gaston [phone]    Patient: Honore Epley  MR Number: [id number]  YOB: 1955  Date of Visit: 12/21/2018    Dear Dr. Mary Cm: Thank you for your referral. Progress note attached in visit summary. If you have questions, please do not hesitate to call me. I look forward to following Kandy along with you.     Sincerely,        Eleazar Anglin MD

## 2019-01-10 ENCOUNTER — TELEPHONE (OUTPATIENT)
Dept: CARDIOLOGY CLINIC | Age: 64
End: 2019-01-10

## 2019-01-14 ENCOUNTER — OFFICE VISIT (OUTPATIENT)
Dept: CARDIOLOGY CLINIC | Age: 64
End: 2019-01-14
Payer: COMMERCIAL

## 2019-01-14 VITALS
HEART RATE: 68 BPM | DIASTOLIC BLOOD PRESSURE: 82 MMHG | OXYGEN SATURATION: 98 % | HEIGHT: 66 IN | SYSTOLIC BLOOD PRESSURE: 126 MMHG | WEIGHT: 217 LBS | BODY MASS INDEX: 34.87 KG/M2

## 2019-01-14 DIAGNOSIS — I48.19 PERSISTENT ATRIAL FIBRILLATION (HCC): ICD-10-CM

## 2019-01-14 DIAGNOSIS — Z71.89 ENCOUNTER TO DISCUSS PROCEDURE: ICD-10-CM

## 2019-01-14 DIAGNOSIS — I42.1 HOCM (HYPERTROPHIC OBSTRUCTIVE CARDIOMYOPATHY) (HCC): Primary | ICD-10-CM

## 2019-01-14 PROCEDURE — 99215 OFFICE O/P EST HI 40 MIN: CPT | Performed by: INTERNAL MEDICINE

## 2019-01-14 PROCEDURE — 93000 ELECTROCARDIOGRAM COMPLETE: CPT | Performed by: INTERNAL MEDICINE

## 2019-01-16 ENCOUNTER — HOSPITAL ENCOUNTER (OUTPATIENT)
Dept: CARDIAC CATH/INVASIVE PROCEDURES | Age: 64
Discharge: HOME | End: 2019-01-16
Payer: COMMERCIAL

## 2019-01-16 VITALS — HEIGHT: 66 IN | WEIGHT: 217.37 LBS | BODY MASS INDEX: 34.93 KG/M2

## 2019-01-16 LAB
EKG ATRIAL RATE: 70 BPM
EKG DIAGNOSIS: NORMAL
EKG P AXIS: 64 DEGREES
EKG P-R INTERVAL: 196 MS
EKG Q-T INTERVAL: 476 MS
EKG QRS DURATION: 110 MS
EKG QTC CALCULATION (BAZETT): 514 MS
EKG R AXIS: 6 DEGREES
EKG T AXIS: 186 DEGREES
EKG VENTRICULAR RATE: 70 BPM

## 2019-01-16 PROCEDURE — 93010 ELECTROCARDIOGRAM REPORT: CPT | Performed by: INTERNAL MEDICINE

## 2019-01-16 PROCEDURE — 93312 ECHO TRANSESOPHAGEAL: CPT | Performed by: INTERNAL MEDICINE

## 2019-01-16 PROCEDURE — 92960 CARDIOVERSION ELECTRIC EXT: CPT | Performed by: INTERNAL MEDICINE

## 2019-01-16 PROCEDURE — 93005 ELECTROCARDIOGRAM TRACING: CPT | Performed by: INTERNAL MEDICINE

## 2019-01-16 PROCEDURE — 93325 DOPPLER ECHO COLOR FLOW MAPG: CPT | Performed by: FAMILY MEDICINE

## 2019-01-16 PROCEDURE — 92960 CARDIOVERSION ELECTRIC EXT: CPT | Performed by: FAMILY MEDICINE

## 2019-01-16 PROCEDURE — 93320 DOPPLER ECHO COMPLETE: CPT | Performed by: FAMILY MEDICINE

## 2019-01-16 PROCEDURE — 93312 ECHO TRANSESOPHAGEAL: CPT | Performed by: FAMILY MEDICINE

## 2019-01-16 RX ORDER — SODIUM CHLORIDE 0.9 % (FLUSH) 0.9 %
10 SYRINGE (ML) INJECTION EVERY 12 HOURS SCHEDULED
Status: DISCONTINUED | OUTPATIENT
Start: 2019-01-16 | End: 2019-01-17 | Stop reason: HOSPADM

## 2019-01-16 RX ORDER — CARVEDILOL 25 MG/1
12.5 TABLET ORAL 2 TIMES DAILY
Qty: 180 TABLET | Refills: 3 | Status: SHIPPED | OUTPATIENT
Start: 2019-01-16 | End: 2019-02-01

## 2019-01-16 RX ORDER — SODIUM CHLORIDE 9 MG/ML
INJECTION, SOLUTION INTRAVENOUS CONTINUOUS
Status: DISCONTINUED | OUTPATIENT
Start: 2019-01-16 | End: 2019-01-17 | Stop reason: HOSPADM

## 2019-01-16 RX ORDER — SODIUM CHLORIDE 0.9 % (FLUSH) 0.9 %
10 SYRINGE (ML) INJECTION PRN
Status: DISCONTINUED | OUTPATIENT
Start: 2019-01-16 | End: 2019-01-17 | Stop reason: HOSPADM

## 2019-02-01 ENCOUNTER — OFFICE VISIT (OUTPATIENT)
Dept: CARDIOLOGY CLINIC | Age: 64
End: 2019-02-01
Payer: COMMERCIAL

## 2019-02-01 VITALS
SYSTOLIC BLOOD PRESSURE: 140 MMHG | OXYGEN SATURATION: 97 % | BODY MASS INDEX: 34.55 KG/M2 | DIASTOLIC BLOOD PRESSURE: 84 MMHG | WEIGHT: 215 LBS | HEART RATE: 64 BPM | HEIGHT: 66 IN

## 2019-02-01 DIAGNOSIS — Z71.89 ENCOUNTER TO DISCUSS PROCEDURE: ICD-10-CM

## 2019-02-01 DIAGNOSIS — I42.1 HOCM (HYPERTROPHIC OBSTRUCTIVE CARDIOMYOPATHY) (HCC): ICD-10-CM

## 2019-02-01 DIAGNOSIS — I48.21 PERMANENT ATRIAL FIBRILLATION (HCC): Primary | ICD-10-CM

## 2019-02-01 PROCEDURE — 93000 ELECTROCARDIOGRAM COMPLETE: CPT | Performed by: INTERNAL MEDICINE

## 2019-02-01 PROCEDURE — 99215 OFFICE O/P EST HI 40 MIN: CPT | Performed by: INTERNAL MEDICINE

## 2019-02-01 RX ORDER — CARVEDILOL 25 MG/1
25 TABLET ORAL 2 TIMES DAILY
Qty: 60 TABLET | Refills: 2
Start: 2019-02-01 | End: 2019-08-28 | Stop reason: SDUPTHER

## 2019-02-05 ENCOUNTER — TELEPHONE (OUTPATIENT)
Dept: CARDIOLOGY CLINIC | Age: 64
End: 2019-02-05

## 2019-02-08 ENCOUNTER — TELEPHONE (OUTPATIENT)
Dept: CARDIOLOGY CLINIC | Age: 64
End: 2019-02-08

## 2019-02-08 ENCOUNTER — HOSPITAL ENCOUNTER (OUTPATIENT)
Dept: CARDIAC CATH/INVASIVE PROCEDURES | Age: 64
Discharge: HOME | End: 2019-02-08
Payer: COMMERCIAL

## 2019-02-08 VITALS — BODY MASS INDEX: 35.04 KG/M2 | WEIGHT: 218.03 LBS | HEIGHT: 66 IN

## 2019-02-08 DIAGNOSIS — I48.21 PERMANENT ATRIAL FIBRILLATION (HCC): ICD-10-CM

## 2019-02-08 LAB
ANION GAP SERPL CALCULATED.3IONS-SCNC: 14 MMOL/L (ref 3–16)
BUN BLDV-MCNC: 27 MG/DL (ref 7–20)
CALCIUM SERPL-MCNC: 9.5 MG/DL (ref 8.3–10.6)
CHLORIDE BLD-SCNC: 98 MMOL/L (ref 99–110)
CO2: 26 MMOL/L (ref 21–32)
CREAT SERPL-MCNC: 1.1 MG/DL (ref 0.6–1.2)
EKG ATRIAL RATE: 70 BPM
EKG DIAGNOSIS: NORMAL
EKG P AXIS: 64 DEGREES
EKG P-R INTERVAL: 212 MS
EKG Q-T INTERVAL: 480 MS
EKG QRS DURATION: 106 MS
EKG QTC CALCULATION (BAZETT): 518 MS
EKG R AXIS: 7 DEGREES
EKG T AXIS: 193 DEGREES
EKG VENTRICULAR RATE: 70 BPM
GFR AFRICAN AMERICAN: >60
GFR NON-AFRICAN AMERICAN: 50
GLUCOSE BLD-MCNC: 70 MG/DL (ref 70–99)
HCT VFR BLD CALC: 44.9 % (ref 36–48)
HEMOGLOBIN: 15.6 G/DL (ref 12–16)
INR BLD: 1.54 (ref 0.86–1.14)
MCH RBC QN AUTO: 32.2 PG (ref 26–34)
MCHC RBC AUTO-ENTMCNC: 34.8 G/DL (ref 31–36)
MCV RBC AUTO: 92.5 FL (ref 80–100)
PDW BLD-RTO: 14.2 % (ref 12.4–15.4)
PLATELET # BLD: 210 K/UL (ref 135–450)
PMV BLD AUTO: 7.7 FL (ref 5–10.5)
POTASSIUM SERPL-SCNC: 3.9 MMOL/L (ref 3.5–5.1)
PROTHROMBIN TIME: 17.5 SEC (ref 9.8–13)
RBC # BLD: 4.86 M/UL (ref 4–5.2)
SODIUM BLD-SCNC: 138 MMOL/L (ref 136–145)
WBC # BLD: 6.9 K/UL (ref 4–11)

## 2019-02-08 PROCEDURE — 92960 CARDIOVERSION ELECTRIC EXT: CPT | Performed by: INTERNAL MEDICINE

## 2019-02-08 PROCEDURE — 93005 ELECTROCARDIOGRAM TRACING: CPT | Performed by: INTERNAL MEDICINE

## 2019-02-08 PROCEDURE — 93010 ELECTROCARDIOGRAM REPORT: CPT | Performed by: INTERNAL MEDICINE

## 2019-02-08 PROCEDURE — 80048 BASIC METABOLIC PNL TOTAL CA: CPT

## 2019-02-08 PROCEDURE — 85610 PROTHROMBIN TIME: CPT

## 2019-02-08 PROCEDURE — 92960 CARDIOVERSION ELECTRIC EXT: CPT | Performed by: FAMILY MEDICINE

## 2019-02-08 PROCEDURE — 85027 COMPLETE CBC AUTOMATED: CPT

## 2019-02-08 PROCEDURE — 2500000003 HC RX 250 WO HCPCS

## 2019-02-08 RX ORDER — AMIODARONE HYDROCHLORIDE 200 MG/1
200 TABLET ORAL DAILY
Qty: 30 TABLET | Refills: 3 | Status: SHIPPED | OUTPATIENT
Start: 2019-02-08 | End: 2019-02-27 | Stop reason: ALTCHOICE

## 2019-02-08 RX ORDER — SODIUM CHLORIDE 0.9 % (FLUSH) 0.9 %
10 SYRINGE (ML) INJECTION EVERY 12 HOURS SCHEDULED
Status: DISCONTINUED | OUTPATIENT
Start: 2019-02-08 | End: 2019-02-09 | Stop reason: HOSPADM

## 2019-02-08 RX ORDER — SODIUM CHLORIDE 9 MG/ML
INJECTION, SOLUTION INTRAVENOUS CONTINUOUS
Status: DISCONTINUED | OUTPATIENT
Start: 2019-02-08 | End: 2019-02-09 | Stop reason: HOSPADM

## 2019-02-08 RX ORDER — QUINIDINE GLUCONATE 324 MG/1
324 TABLET, EXTENDED RELEASE ORAL DAILY
Qty: 30 TABLET | Refills: 3 | Status: SHIPPED | OUTPATIENT
Start: 2019-02-08 | End: 2019-02-08 | Stop reason: HOSPADM

## 2019-02-08 RX ORDER — SODIUM CHLORIDE 0.9 % (FLUSH) 0.9 %
10 SYRINGE (ML) INJECTION PRN
Status: DISCONTINUED | OUTPATIENT
Start: 2019-02-08 | End: 2019-02-09 | Stop reason: HOSPADM

## 2019-02-27 ENCOUNTER — OFFICE VISIT (OUTPATIENT)
Dept: CARDIOLOGY CLINIC | Age: 64
End: 2019-02-27
Payer: COMMERCIAL

## 2019-02-27 ENCOUNTER — TELEPHONE (OUTPATIENT)
Dept: CARDIOLOGY CLINIC | Age: 64
End: 2019-02-27

## 2019-02-27 VITALS
SYSTOLIC BLOOD PRESSURE: 114 MMHG | HEIGHT: 66 IN | HEART RATE: 70 BPM | WEIGHT: 219 LBS | OXYGEN SATURATION: 97 % | BODY MASS INDEX: 35.2 KG/M2 | DIASTOLIC BLOOD PRESSURE: 60 MMHG

## 2019-02-27 DIAGNOSIS — I48.21 PERMANENT ATRIAL FIBRILLATION (HCC): Primary | ICD-10-CM

## 2019-02-27 DIAGNOSIS — I42.1 HOCM (HYPERTROPHIC OBSTRUCTIVE CARDIOMYOPATHY) (HCC): ICD-10-CM

## 2019-02-27 PROCEDURE — 93000 ELECTROCARDIOGRAM COMPLETE: CPT | Performed by: INTERNAL MEDICINE

## 2019-02-27 PROCEDURE — 99214 OFFICE O/P EST MOD 30 MIN: CPT | Performed by: INTERNAL MEDICINE

## 2019-03-07 ENCOUNTER — TELEPHONE (OUTPATIENT)
Dept: CARDIOLOGY CLINIC | Age: 64
End: 2019-03-07

## 2019-03-07 ENCOUNTER — NURSE ONLY (OUTPATIENT)
Dept: CARDIOLOGY CLINIC | Age: 64
End: 2019-03-07
Payer: COMMERCIAL

## 2019-03-07 DIAGNOSIS — I48.91 ATRIAL FIBRILLATION, UNSPECIFIED TYPE (HCC): Primary | ICD-10-CM

## 2019-03-07 PROCEDURE — 93000 ELECTROCARDIOGRAM COMPLETE: CPT | Performed by: INTERNAL MEDICINE

## 2019-05-15 ENCOUNTER — TELEPHONE (OUTPATIENT)
Dept: CARDIOLOGY CLINIC | Age: 64
End: 2019-05-15

## 2019-05-15 NOTE — TELEPHONE ENCOUNTER
Patient advised of test result(s) and verbalized understanding. Thank you for allowing me to see Melanie Gains today. It has been a pleasure to provide medical care for your child. Continue with [facility].

## 2019-05-16 ENCOUNTER — TELEPHONE (OUTPATIENT)
Dept: CARDIOLOGY CLINIC | Age: 64
End: 2019-05-16

## 2019-05-16 NOTE — TELEPHONE ENCOUNTER
Dr. Jaun Palmer,     Pt scheduled for ablation on 5/21/19. Please review Amina's message and advise, thanks.

## 2019-05-16 NOTE — TELEPHONE ENCOUNTER
Marily Narvaez is calling stating she is having an ablation on 5/21/19 and has some questions.     Marily Narvaez can be reached at [phone]

## 2019-05-16 NOTE — TELEPHONE ENCOUNTER
Patient called back and stated she had epidural steroid injection in her back on 4/22/19 and wants to know if this is going to interfere with the ablation. Please advise. Patient can be reached at [phone].

## 2019-05-21 ENCOUNTER — HOSPITAL ENCOUNTER (INPATIENT)
Dept: CARDIAC CATH/INVASIVE PROCEDURES | Age: 64
LOS: 1 days | Discharge: HOME | DRG: 274 | End: 2019-05-22
Attending: INTERNAL MEDICINE | Admitting: INTERNAL MEDICINE
Payer: COMMERCIAL

## 2019-05-21 ENCOUNTER — ANESTHESIA (OUTPATIENT)
Dept: CARDIAC CATH/INVASIVE PROCEDURES | Age: 64
End: 2019-05-21

## 2019-05-21 ENCOUNTER — ANESTHESIA EVENT (OUTPATIENT)
Dept: CARDIAC CATH/INVASIVE PROCEDURES | Age: 64
End: 2019-05-21

## 2019-05-21 VITALS
TEMPERATURE: 96.6 F | RESPIRATION RATE: 21 BRPM | SYSTOLIC BLOOD PRESSURE: 121 MMHG | OXYGEN SATURATION: 87 % | DIASTOLIC BLOOD PRESSURE: 76 MMHG

## 2019-05-21 DIAGNOSIS — I48.19 PERSISTENT ATRIAL FIBRILLATION (HCC): ICD-10-CM

## 2019-05-21 LAB
ABO/RH: NORMAL
ACTIVATED CLOTTING TIME: 217 SEC (ref 99–130)
ACTIVATED CLOTTING TIME: 275 SEC (ref 99–130)
ACTIVATED CLOTTING TIME: 351 SEC (ref 99–130)
ACTIVATED CLOTTING TIME: 366 SEC (ref 99–130)
ACTIVATED CLOTTING TIME: 368 SEC (ref 99–130)
ACTIVATED CLOTTING TIME: 380 SEC (ref 99–130)
ACTIVATED CLOTTING TIME: 384 SEC (ref 99–130)
ANTIBODY SCREEN: NORMAL
HCT VFR BLD CALC: 44.9 % (ref 36–48)
HEMOGLOBIN: 15.6 G/DL (ref 12–16)
MCH RBC QN AUTO: 32.5 PG (ref 26–34)
MCHC RBC AUTO-ENTMCNC: 34.8 G/DL (ref 31–36)
MCV RBC AUTO: 93.3 FL (ref 80–100)
PDW BLD-RTO: 14.3 % (ref 12.4–15.4)
PLATELET # BLD: 253 K/UL (ref 135–450)
PMV BLD AUTO: 6.7 FL (ref 5–10.5)
POC ACT LR: >400 SEC
RBC # BLD: 4.81 M/UL (ref 4–5.2)
WBC # BLD: 5.3 K/UL (ref 4–11)

## 2019-05-21 PROCEDURE — 6360000002 HC RX W HCPCS: Performed by: NURSE ANESTHETIST, CERTIFIED REGISTERED

## 2019-05-21 PROCEDURE — 4A0234Z MEASUREMENT OF CARDIAC ELECTRICAL ACTIVITY, PERCUTANEOUS APPROACH: ICD-10-PCS | Performed by: INTERNAL MEDICINE

## 2019-05-21 PROCEDURE — 4A023FZ MEASUREMENT OF CARDIAC RHYTHM, PERCUTANEOUS APPROACH: ICD-10-PCS | Performed by: INTERNAL MEDICINE

## 2019-05-21 PROCEDURE — 3700000000 HC ANESTHESIA ATTENDED CARE

## 2019-05-21 PROCEDURE — 2580000003 HC RX 258: Performed by: NURSE ANESTHETIST, CERTIFIED REGISTERED

## 2019-05-21 PROCEDURE — 2100000000 HC CCU R&B

## 2019-05-21 PROCEDURE — 02583ZZ DESTRUCTION OF CONDUCTION MECHANISM, PERCUTANEOUS APPROACH: ICD-10-PCS | Performed by: INTERNAL MEDICINE

## 2019-05-21 PROCEDURE — 93312 ECHO TRANSESOPHAGEAL: CPT | Performed by: FAMILY MEDICINE

## 2019-05-21 PROCEDURE — 2580000003 HC RX 258: Performed by: INTERNAL MEDICINE

## 2019-05-21 PROCEDURE — 6370000000 HC RX 637 (ALT 250 FOR IP): Performed by: INTERNAL MEDICINE

## 2019-05-21 PROCEDURE — C1759 CATH, INTRA ECHOCARDIOGRAPHY: HCPCS

## 2019-05-21 PROCEDURE — 85347 COAGULATION TIME ACTIVATED: CPT

## 2019-05-21 PROCEDURE — 3700000001 HC ADD 15 MINUTES (ANESTHESIA)

## 2019-05-21 PROCEDURE — 93320 DOPPLER ECHO COMPLETE: CPT | Performed by: FAMILY MEDICINE

## 2019-05-21 PROCEDURE — 86901 BLOOD TYPING SEROLOGIC RH(D): CPT

## 2019-05-21 PROCEDURE — 93662 INTRACARDIAC ECG (ICE): CPT | Performed by: FAMILY MEDICINE

## 2019-05-21 PROCEDURE — C1732 CATH, EP, DIAG/ABL, 3D/VECT: HCPCS

## 2019-05-21 PROCEDURE — 94640 AIRWAY INHALATION TREATMENT: CPT

## 2019-05-21 PROCEDURE — 2500000003 HC RX 250 WO HCPCS: Performed by: NURSE ANESTHETIST, CERTIFIED REGISTERED

## 2019-05-21 PROCEDURE — 93662 INTRACARDIAC ECG (ICE): CPT | Performed by: INTERNAL MEDICINE

## 2019-05-21 PROCEDURE — 94762 N-INVAS EAR/PLS OXIMTRY CONT: CPT

## 2019-05-21 PROCEDURE — 94761 N-INVAS EAR/PLS OXIMETRY MLT: CPT

## 2019-05-21 PROCEDURE — 5A2204Z RESTORATION OF CARDIAC RHYTHM, SINGLE: ICD-10-PCS | Performed by: INTERNAL MEDICINE

## 2019-05-21 PROCEDURE — B24BZZ4 ULTRASONOGRAPHY OF HEART WITH AORTA, TRANSESOPHAGEAL: ICD-10-PCS | Performed by: INTERNAL MEDICINE

## 2019-05-21 PROCEDURE — 93325 DOPPLER ECHO COLOR FLOW MAPG: CPT | Performed by: FAMILY MEDICINE

## 2019-05-21 PROCEDURE — 6360000002 HC RX W HCPCS

## 2019-05-21 PROCEDURE — 93613 INTRACARDIAC EPHYS 3D MAPG: CPT | Performed by: INTERNAL MEDICINE

## 2019-05-21 PROCEDURE — 2700000000 HC OXYGEN THERAPY PER DAY

## 2019-05-21 PROCEDURE — 2500000003 HC RX 250 WO HCPCS

## 2019-05-21 PROCEDURE — 93656 COMPRE EP EVAL ABLTJ ATR FIB: CPT | Performed by: INTERNAL MEDICINE

## 2019-05-21 PROCEDURE — 85027 COMPLETE CBC AUTOMATED: CPT

## 2019-05-21 PROCEDURE — 93656 COMPRE EP EVAL ABLTJ ATR FIB: CPT | Performed by: FAMILY MEDICINE

## 2019-05-21 PROCEDURE — 93623 PRGRMD STIMJ&PACG IV RX NFS: CPT | Performed by: INTERNAL MEDICINE

## 2019-05-21 PROCEDURE — 2720000010 HC SURG SUPPLY STERILE

## 2019-05-21 PROCEDURE — 93005 ELECTROCARDIOGRAM TRACING: CPT | Performed by: INTERNAL MEDICINE

## 2019-05-21 PROCEDURE — 02K83ZZ MAP CONDUCTION MECHANISM, PERCUTANEOUS APPROACH: ICD-10-PCS | Performed by: INTERNAL MEDICINE

## 2019-05-21 PROCEDURE — 93657 TX L/R ATRIAL FIB ADDL: CPT | Performed by: FAMILY MEDICINE

## 2019-05-21 PROCEDURE — 2580000003 HC RX 258

## 2019-05-21 PROCEDURE — C1730 CATH, EP, 19 OR FEW ELECT: HCPCS

## 2019-05-21 PROCEDURE — C1894 INTRO/SHEATH, NON-LASER: HCPCS

## 2019-05-21 PROCEDURE — 93657 TX L/R ATRIAL FIB ADDL: CPT | Performed by: INTERNAL MEDICINE

## 2019-05-21 PROCEDURE — 86850 RBC ANTIBODY SCREEN: CPT

## 2019-05-21 PROCEDURE — 86900 BLOOD TYPING SEROLOGIC ABO: CPT

## 2019-05-21 PROCEDURE — 93613 INTRACARDIAC EPHYS 3D MAPG: CPT | Performed by: FAMILY MEDICINE

## 2019-05-21 RX ORDER — FLUTICASONE PROPIONATE 50 MCG
1 SPRAY, SUSPENSION (ML) NASAL DAILY
Status: DISCONTINUED | OUTPATIENT
Start: 2019-05-21 | End: 2019-05-21

## 2019-05-21 RX ORDER — FENTANYL CITRATE 50 UG/ML
50 INJECTION, SOLUTION INTRAMUSCULAR; INTRAVENOUS EVERY 5 MIN PRN
Status: DISCONTINUED | OUTPATIENT
Start: 2019-05-21 | End: 2019-05-21

## 2019-05-21 RX ORDER — PANTOPRAZOLE SODIUM 40 MG/1
40 TABLET, DELAYED RELEASE ORAL 2 TIMES DAILY
Status: DISCONTINUED | OUTPATIENT
Start: 2019-05-21 | End: 2019-05-22 | Stop reason: HOSPADM

## 2019-05-21 RX ORDER — FLECAINIDE ACETATE 100 MG/1
50 TABLET ORAL EVERY 12 HOURS SCHEDULED
Status: DISCONTINUED | OUTPATIENT
Start: 2019-05-21 | End: 2019-05-22 | Stop reason: HOSPADM

## 2019-05-21 RX ORDER — HEPARIN SODIUM 1000 [USP'U]/ML
INJECTION, SOLUTION INTRAVENOUS; SUBCUTANEOUS PRN
Status: DISCONTINUED | OUTPATIENT
Start: 2019-05-21 | End: 2019-05-21 | Stop reason: SDUPTHER

## 2019-05-21 RX ORDER — 0.9 % SODIUM CHLORIDE 0.9 %
VIAL (ML) INJECTION PRN
Status: DISCONTINUED | OUTPATIENT
Start: 2019-05-21 | End: 2019-05-21 | Stop reason: SDUPTHER

## 2019-05-21 RX ORDER — VECURONIUM BROMIDE 1 MG/ML
INJECTION, POWDER, LYOPHILIZED, FOR SOLUTION INTRAVENOUS PRN
Status: DISCONTINUED | OUTPATIENT
Start: 2019-05-21 | End: 2019-05-21 | Stop reason: SDUPTHER

## 2019-05-21 RX ORDER — CLOTRIMAZOLE AND BETAMETHASONE DIPROPIONATE 10; .64 MG/G; MG/G
CREAM TOPICAL 2 TIMES DAILY
Status: DISCONTINUED | OUTPATIENT
Start: 2019-05-21 | End: 2019-05-21

## 2019-05-21 RX ORDER — LOSARTAN POTASSIUM 50 MG/1
100 TABLET ORAL DAILY
Status: DISCONTINUED | OUTPATIENT
Start: 2019-05-22 | End: 2019-05-22 | Stop reason: HOSPADM

## 2019-05-21 RX ORDER — FENTANYL CITRATE 50 UG/ML
INJECTION, SOLUTION INTRAMUSCULAR; INTRAVENOUS PRN
Status: DISCONTINUED | OUTPATIENT
Start: 2019-05-21 | End: 2019-05-21 | Stop reason: SDUPTHER

## 2019-05-21 RX ORDER — MORPHINE SULFATE 2 MG/ML
2 INJECTION, SOLUTION INTRAMUSCULAR; INTRAVENOUS EVERY 4 HOURS PRN
Status: DISCONTINUED | OUTPATIENT
Start: 2019-05-21 | End: 2019-05-22 | Stop reason: HOSPADM

## 2019-05-21 RX ORDER — LIDOCAINE HYDROCHLORIDE 10 MG/ML
INJECTION, SOLUTION EPIDURAL; INFILTRATION; INTRACAUDAL; PERINEURAL PRN
Status: DISCONTINUED | OUTPATIENT
Start: 2019-05-21 | End: 2019-05-21 | Stop reason: SDUPTHER

## 2019-05-21 RX ORDER — HYDROCHLOROTHIAZIDE 25 MG/1
25 TABLET ORAL DAILY
Status: DISCONTINUED | OUTPATIENT
Start: 2019-05-22 | End: 2019-05-22 | Stop reason: HOSPADM

## 2019-05-21 RX ORDER — LOSARTAN POTASSIUM AND HYDROCHLOROTHIAZIDE 25; 100 MG/1; MG/1
1 TABLET ORAL DAILY
Status: DISCONTINUED | OUTPATIENT
Start: 2019-05-21 | End: 2019-05-21

## 2019-05-21 RX ORDER — DEXAMETHASONE SODIUM PHOSPHATE 4 MG/ML
INJECTION, SOLUTION INTRA-ARTICULAR; INTRALESIONAL; INTRAMUSCULAR; INTRAVENOUS; SOFT TISSUE PRN
Status: DISCONTINUED | OUTPATIENT
Start: 2019-05-21 | End: 2019-05-21 | Stop reason: SDUPTHER

## 2019-05-21 RX ORDER — GABAPENTIN 100 MG/1
200 CAPSULE ORAL NIGHTLY
Status: DISCONTINUED | OUTPATIENT
Start: 2019-05-21 | End: 2019-05-22 | Stop reason: HOSPADM

## 2019-05-21 RX ORDER — FENTANYL CITRATE 50 UG/ML
25 INJECTION, SOLUTION INTRAMUSCULAR; INTRAVENOUS EVERY 5 MIN PRN
Status: DISCONTINUED | OUTPATIENT
Start: 2019-05-21 | End: 2019-05-21

## 2019-05-21 RX ORDER — PROPOFOL 10 MG/ML
INJECTION, EMULSION INTRAVENOUS PRN
Status: DISCONTINUED | OUTPATIENT
Start: 2019-05-21 | End: 2019-05-21 | Stop reason: SDUPTHER

## 2019-05-21 RX ORDER — FUROSEMIDE 20 MG/1
20 TABLET ORAL DAILY
Status: DISCONTINUED | OUTPATIENT
Start: 2019-05-21 | End: 2019-05-22 | Stop reason: HOSPADM

## 2019-05-21 RX ORDER — GLYCOPYRROLATE 0.2 MG/ML
INJECTION INTRAMUSCULAR; INTRAVENOUS PRN
Status: DISCONTINUED | OUTPATIENT
Start: 2019-05-21 | End: 2019-05-21 | Stop reason: SDUPTHER

## 2019-05-21 RX ORDER — CLONIDINE HYDROCHLORIDE 0.1 MG/1
0.2 TABLET ORAL 2 TIMES DAILY
Status: DISCONTINUED | OUTPATIENT
Start: 2019-05-21 | End: 2019-05-22 | Stop reason: HOSPADM

## 2019-05-21 RX ORDER — ROPINIROLE 1 MG/1
0.5 TABLET, FILM COATED ORAL NIGHTLY
Status: DISCONTINUED | OUTPATIENT
Start: 2019-05-21 | End: 2019-05-22 | Stop reason: HOSPADM

## 2019-05-21 RX ORDER — PROCHLORPERAZINE EDISYLATE 5 MG/ML
5 INJECTION INTRAMUSCULAR; INTRAVENOUS
Status: DISCONTINUED | OUTPATIENT
Start: 2019-05-21 | End: 2019-05-21

## 2019-05-21 RX ORDER — ACETAMINOPHEN 325 MG/1
650 TABLET ORAL EVERY 4 HOURS PRN
Status: DISCONTINUED | OUTPATIENT
Start: 2019-05-21 | End: 2019-05-22 | Stop reason: HOSPADM

## 2019-05-21 RX ORDER — FUROSEMIDE 10 MG/ML
INJECTION INTRAMUSCULAR; INTRAVENOUS PRN
Status: DISCONTINUED | OUTPATIENT
Start: 2019-05-21 | End: 2019-05-21 | Stop reason: SDUPTHER

## 2019-05-21 RX ORDER — SODIUM CHLORIDE 0.9 % (FLUSH) 0.9 %
10 SYRINGE (ML) INJECTION PRN
Status: DISCONTINUED | OUTPATIENT
Start: 2019-05-21 | End: 2019-05-21

## 2019-05-21 RX ORDER — MIDAZOLAM HYDROCHLORIDE 1 MG/ML
INJECTION INTRAMUSCULAR; INTRAVENOUS PRN
Status: DISCONTINUED | OUTPATIENT
Start: 2019-05-21 | End: 2019-05-21 | Stop reason: SDUPTHER

## 2019-05-21 RX ORDER — SODIUM CHLORIDE 0.9 % (FLUSH) 0.9 %
10 SYRINGE (ML) INJECTION EVERY 12 HOURS SCHEDULED
Status: DISCONTINUED | OUTPATIENT
Start: 2019-05-21 | End: 2019-05-21

## 2019-05-21 RX ORDER — SODIUM CHLORIDE 9 MG/ML
INJECTION, SOLUTION INTRAVENOUS CONTINUOUS
Status: DISCONTINUED | OUTPATIENT
Start: 2019-05-21 | End: 2019-05-21

## 2019-05-21 RX ORDER — DIPHENHYDRAMINE HCL 25 MG
25 TABLET ORAL NIGHTLY PRN
Status: DISCONTINUED | OUTPATIENT
Start: 2019-05-21 | End: 2019-05-22 | Stop reason: HOSPADM

## 2019-05-21 RX ORDER — SUCCINYLCHOLINE CHLORIDE 20 MG/ML
INJECTION INTRAMUSCULAR; INTRAVENOUS PRN
Status: DISCONTINUED | OUTPATIENT
Start: 2019-05-21 | End: 2019-05-21 | Stop reason: SDUPTHER

## 2019-05-21 RX ORDER — BUDESONIDE AND FORMOTEROL FUMARATE DIHYDRATE 160; 4.5 UG/1; UG/1
2 AEROSOL RESPIRATORY (INHALATION) DAILY
Status: DISCONTINUED | OUTPATIENT
Start: 2019-05-21 | End: 2019-05-21

## 2019-05-21 RX ORDER — ONDANSETRON 2 MG/ML
INJECTION INTRAMUSCULAR; INTRAVENOUS PRN
Status: DISCONTINUED | OUTPATIENT
Start: 2019-05-21 | End: 2019-05-21 | Stop reason: SDUPTHER

## 2019-05-21 RX ORDER — CARVEDILOL 25 MG/1
25 TABLET ORAL 2 TIMES DAILY
Status: DISCONTINUED | OUTPATIENT
Start: 2019-05-21 | End: 2019-05-22 | Stop reason: HOSPADM

## 2019-05-21 RX ADMIN — VECURONIUM BROMIDE 5 MG: 1 INJECTION, POWDER, LYOPHILIZED, FOR SOLUTION INTRAVENOUS at 09:30

## 2019-05-21 RX ADMIN — HEPARIN SODIUM 1000 UNITS: 1000 INJECTION, SOLUTION INTRAVENOUS; SUBCUTANEOUS at 10:46

## 2019-05-21 RX ADMIN — VECURONIUM BROMIDE 5 MG: 1 INJECTION, POWDER, LYOPHILIZED, FOR SOLUTION INTRAVENOUS at 10:06

## 2019-05-21 RX ADMIN — HEPARIN SODIUM 4000 UNITS: 1000 INJECTION, SOLUTION INTRAVENOUS; SUBCUTANEOUS at 09:24

## 2019-05-21 RX ADMIN — CLONIDINE HYDROCHLORIDE 0.2 MG: 0.1 TABLET ORAL at 13:26

## 2019-05-21 RX ADMIN — VECURONIUM BROMIDE 5 MG: 1 INJECTION, POWDER, LYOPHILIZED, FOR SOLUTION INTRAVENOUS at 08:43

## 2019-05-21 RX ADMIN — MIDAZOLAM 2 MG: 1 INJECTION INTRAMUSCULAR; INTRAVENOUS at 07:38

## 2019-05-21 RX ADMIN — HEPARIN SODIUM 3000 UNITS: 1000 INJECTION, SOLUTION INTRAVENOUS; SUBCUTANEOUS at 09:43

## 2019-05-21 RX ADMIN — GABAPENTIN 200 MG: 100 CAPSULE ORAL at 20:23

## 2019-05-21 RX ADMIN — GLYCOPYRROLATE 0.2 MG: 0.2 INJECTION, SOLUTION INTRAMUSCULAR; INTRAVENOUS at 07:48

## 2019-05-21 RX ADMIN — FLECAINIDE ACETATE 50 MG: 100 TABLET ORAL at 20:24

## 2019-05-21 RX ADMIN — HEPARIN SODIUM 3000 UNITS: 1000 INJECTION, SOLUTION INTRAVENOUS; SUBCUTANEOUS at 09:07

## 2019-05-21 RX ADMIN — HEPARIN SODIUM 5000 UNITS: 1000 INJECTION, SOLUTION INTRAVENOUS; SUBCUTANEOUS at 08:33

## 2019-05-21 RX ADMIN — HEPARIN SODIUM 5000 UNITS: 1000 INJECTION, SOLUTION INTRAVENOUS; SUBCUTANEOUS at 08:41

## 2019-05-21 RX ADMIN — MOMETASONE FUROATE AND FORMOTEROL FUMARATE DIHYDRATE 2 PUFF: 200; 5 AEROSOL RESPIRATORY (INHALATION) at 19:59

## 2019-05-21 RX ADMIN — FUROSEMIDE 40 MG: 10 INJECTION, SOLUTION INTRAVENOUS at 10:24

## 2019-05-21 RX ADMIN — HEPARIN SODIUM 2000 UNITS: 1000 INJECTION, SOLUTION INTRAVENOUS; SUBCUTANEOUS at 10:04

## 2019-05-21 RX ADMIN — PROPOFOL 100 MG: 10 INJECTION, EMULSION INTRAVENOUS at 07:43

## 2019-05-21 RX ADMIN — VECURONIUM BROMIDE 10 MG: 1 INJECTION, POWDER, LYOPHILIZED, FOR SOLUTION INTRAVENOUS at 07:46

## 2019-05-21 RX ADMIN — LIDOCAINE HYDROCHLORIDE 50 MG: 10 INJECTION, SOLUTION EPIDURAL; INFILTRATION; INTRACAUDAL; PERINEURAL at 07:43

## 2019-05-21 RX ADMIN — FENTANYL CITRATE 100 MCG: 50 INJECTION INTRAMUSCULAR; INTRAVENOUS at 07:43

## 2019-05-21 RX ADMIN — SUCCINYLCHOLINE CHLORIDE 120 MG: 20 INJECTION, SOLUTION INTRAMUSCULAR; INTRAVENOUS at 07:43

## 2019-05-21 RX ADMIN — RIVAROXABAN 20 MG: 20 TABLET, FILM COATED ORAL at 13:26

## 2019-05-21 RX ADMIN — SUGAMMADEX 200 MG: 100 INJECTION, SOLUTION INTRAVENOUS at 11:26

## 2019-05-21 RX ADMIN — HEPARIN SODIUM 1000 UNITS: 1000 INJECTION, SOLUTION INTRAVENOUS; SUBCUTANEOUS at 10:25

## 2019-05-21 RX ADMIN — Medication: at 20:33

## 2019-05-21 RX ADMIN — SODIUM CHLORIDE 10 ML: 9 INJECTION INTRAMUSCULAR; INTRAVENOUS; SUBCUTANEOUS at 07:46

## 2019-05-21 RX ADMIN — FUROSEMIDE 60 MG: 10 INJECTION, SOLUTION INTRAVENOUS at 11:25

## 2019-05-21 RX ADMIN — ONDANSETRON 4 MG: 2 INJECTION INTRAMUSCULAR; INTRAVENOUS at 07:48

## 2019-05-21 RX ADMIN — PANTOPRAZOLE SODIUM 40 MG: 40 TABLET, DELAYED RELEASE ORAL at 20:24

## 2019-05-21 RX ADMIN — DEXAMETHASONE SODIUM PHOSPHATE 8 MG: 4 INJECTION, SOLUTION INTRAMUSCULAR; INTRAVENOUS at 07:48

## 2019-05-21 RX ADMIN — ROPINIROLE HYDROCHLORIDE 0.5 MG: 1 TABLET, FILM COATED ORAL at 20:24

## 2019-05-21 RX ADMIN — CARVEDILOL 25 MG: 25 TABLET, FILM COATED ORAL at 20:23

## 2019-05-21 RX ADMIN — CLONIDINE HYDROCHLORIDE 0.2 MG: 0.1 TABLET ORAL at 20:24

## 2019-05-21 RX ADMIN — SODIUM CHLORIDE: 9 INJECTION, SOLUTION INTRAVENOUS at 07:37

## 2019-05-21 RX ADMIN — DIPHENHYDRAMINE HCL 25 MG: 25 TABLET ORAL at 23:16

## 2019-05-21 ASSESSMENT — PULMONARY FUNCTION TESTS
PIF_VALUE: 28
PIF_VALUE: 29
PIF_VALUE: 29
PIF_VALUE: 27
PIF_VALUE: 30
PIF_VALUE: 29
PIF_VALUE: 28
PIF_VALUE: 26
PIF_VALUE: 30
PIF_VALUE: 29
PIF_VALUE: 1
PIF_VALUE: 29
PIF_VALUE: 30
PIF_VALUE: 29
PIF_VALUE: 26
PIF_VALUE: 34
PIF_VALUE: 30
PIF_VALUE: 28
PIF_VALUE: 1
PIF_VALUE: 28
PIF_VALUE: 27
PIF_VALUE: 27
PIF_VALUE: 29
PIF_VALUE: 16
PIF_VALUE: 29
PIF_VALUE: 28
PIF_VALUE: 27
PIF_VALUE: 29
PIF_VALUE: 29
PIF_VALUE: 28
PIF_VALUE: 30
PIF_VALUE: 29
PIF_VALUE: 30
PIF_VALUE: 28
PIF_VALUE: 27
PIF_VALUE: 29
PIF_VALUE: 28
PIF_VALUE: 29
PIF_VALUE: 28
PIF_VALUE: 29
PIF_VALUE: 28
PIF_VALUE: 29
PIF_VALUE: 28
PIF_VALUE: 30
PIF_VALUE: 30
PIF_VALUE: 29
PIF_VALUE: 28
PIF_VALUE: 29
PIF_VALUE: 30
PIF_VALUE: 29
PIF_VALUE: 28
PIF_VALUE: 29
PIF_VALUE: 28
PIF_VALUE: 1
PIF_VALUE: 28
PIF_VALUE: 30
PIF_VALUE: 28
PIF_VALUE: 29
PIF_VALUE: 30
PIF_VALUE: 29
PIF_VALUE: 29
PIF_VALUE: 28
PIF_VALUE: 29
PIF_VALUE: 30
PIF_VALUE: 29
PIF_VALUE: 30
PIF_VALUE: 29
PIF_VALUE: 26
PIF_VALUE: 29
PIF_VALUE: 27
PIF_VALUE: 29
PIF_VALUE: 29
PIF_VALUE: 26
PIF_VALUE: 29
PIF_VALUE: 26
PIF_VALUE: 26
PIF_VALUE: 29
PIF_VALUE: 28
PIF_VALUE: 28
PIF_VALUE: 27
PIF_VALUE: 28
PIF_VALUE: 29
PIF_VALUE: 28
PIF_VALUE: 1
PIF_VALUE: 29
PIF_VALUE: 29
PIF_VALUE: 28
PIF_VALUE: 30
PIF_VALUE: 28
PIF_VALUE: 30
PIF_VALUE: 28
PIF_VALUE: 29
PIF_VALUE: 30
PIF_VALUE: 29
PIF_VALUE: 2
PIF_VALUE: 29
PIF_VALUE: 28
PIF_VALUE: 29
PIF_VALUE: 29
PIF_VALUE: 28
PIF_VALUE: 30
PIF_VALUE: 26
PIF_VALUE: 29
PIF_VALUE: 30
PIF_VALUE: 29
PIF_VALUE: 29
PIF_VALUE: 30
PIF_VALUE: 28
PIF_VALUE: 29
PIF_VALUE: 29
PIF_VALUE: 1
PIF_VALUE: 29
PIF_VALUE: 28
PIF_VALUE: 29
PIF_VALUE: 29
PIF_VALUE: 30
PIF_VALUE: 29
PIF_VALUE: 29
PIF_VALUE: 30
PIF_VALUE: 28
PIF_VALUE: 29
PIF_VALUE: 28
PIF_VALUE: 27
PIF_VALUE: 28
PIF_VALUE: 29
PIF_VALUE: 30
PIF_VALUE: 29
PIF_VALUE: 27
PIF_VALUE: 29
PIF_VALUE: 29
PIF_VALUE: 28
PIF_VALUE: 28
PIF_VALUE: 29
PIF_VALUE: 29
PIF_VALUE: 30
PIF_VALUE: 28
PIF_VALUE: 29
PIF_VALUE: 29
PIF_VALUE: 26
PIF_VALUE: 29
PIF_VALUE: 28
PIF_VALUE: 30
PIF_VALUE: 28
PIF_VALUE: 29
PIF_VALUE: 28
PIF_VALUE: 29
PIF_VALUE: 27
PIF_VALUE: 29
PIF_VALUE: 28
PIF_VALUE: 28
PIF_VALUE: 29
PIF_VALUE: 29
PIF_VALUE: 28
PIF_VALUE: 28
PIF_VALUE: 16
PIF_VALUE: 30
PIF_VALUE: 29
PIF_VALUE: 28
PIF_VALUE: 29
PIF_VALUE: 28
PIF_VALUE: 29
PIF_VALUE: 29
PIF_VALUE: 1
PIF_VALUE: 29
PIF_VALUE: 27
PIF_VALUE: 16
PIF_VALUE: 29
PIF_VALUE: 28
PIF_VALUE: 28
PIF_VALUE: 29
PIF_VALUE: 27
PIF_VALUE: 29
PIF_VALUE: 29
PIF_VALUE: 30
PIF_VALUE: 31
PIF_VALUE: 25
PIF_VALUE: 26
PIF_VALUE: 29
PIF_VALUE: 29
PIF_VALUE: 28
PIF_VALUE: 29
PIF_VALUE: 28
PIF_VALUE: 30
PIF_VALUE: 29
PIF_VALUE: 28
PIF_VALUE: 29
PIF_VALUE: 28
PIF_VALUE: 29
PIF_VALUE: 25
PIF_VALUE: 29
PIF_VALUE: 29
PIF_VALUE: 28
PIF_VALUE: 30
PIF_VALUE: 29
PIF_VALUE: 29
PIF_VALUE: 30
PIF_VALUE: 29
PIF_VALUE: 27
PIF_VALUE: 28
PIF_VALUE: 30

## 2019-05-21 ASSESSMENT — PAIN SCALES - GENERAL
PAINLEVEL_OUTOF10: 0
PAINLEVEL_OUTOF10: 3
PAINLEVEL_OUTOF10: 0

## 2019-05-21 ASSESSMENT — ENCOUNTER SYMPTOMS: SHORTNESS OF BREATH: 1

## 2019-05-21 ASSESSMENT — PAIN - FUNCTIONAL ASSESSMENT: PAIN_FUNCTIONAL_ASSESSMENT: ACTIVITIES ARE NOT PREVENTED

## 2019-05-21 ASSESSMENT — PAIN DESCRIPTION - DESCRIPTORS: DESCRIPTORS: OTHER (COMMENT)

## 2019-05-21 ASSESSMENT — PAIN DESCRIPTION - PAIN TYPE: TYPE: CHRONIC PAIN

## 2019-05-21 ASSESSMENT — PAIN DESCRIPTION - ORIENTATION: ORIENTATION: LOWER;MID

## 2019-05-21 ASSESSMENT — PAIN DESCRIPTION - LOCATION: LOCATION: BACK

## 2019-05-21 NOTE — PROGRESS NOTES
1150: Pt arrived from cath lab to CVU following an a-fib ablation with Dr. Silviano Guzman. She is drowsy but a/o and answers questions appropriately. She is aware of her restrictions. R groin is stable with figure of 8 stitch intact covered with gauze and tegaderm. 1200: Assessment complete. Wounds to bilateral LE apparent. Cleaned and covered with mepilex bandage. Wound care nurse was consulted. 1330: Scheduled medication given. She reports slight tenderness to touch in R groin, no real pain though. 1700: Pt ambulated in hallway independently over 500 feet and tolerated it well. Settled into recliner. Dinner ordered. 1730: Andrade catheter removed without incident. 1815: Pt up to BR for urine.    1930: Handoff report given to [facility].

## 2019-05-21 NOTE — H&P
[address]   Cardiac Electrophysiology Consultation   Date: 5/21/2019  Reason for Consultation: Afib  Consult Requesting Physician: Lio Collier MD  Primary Care Physician: Nasrin Arndt MD     Chief Complaint:        Chief Complaint   Patient presents with    Follow-up       2 week follow up after procedure / A-Fib, HTN, HLD, HOCM / no chest pain    Dizziness       pt states she has been feeling dizzy since being put on amiodarone    Shortness of Breath       DX of COPD      HPI: Volodymyr Begum is a 61 y.o. female with a PMH significant for HOCM and diastolic dysfunction, moderate MR, HTN, COPD, VARUN compliant with CPAP, 0.5-1 PPD smoker, persistent/ long-standing atrial fibrillation (dating back to 2014) who is typically followed by Dr. Nico Keene for her general cardiac needs. She re-establish care with the EP service on 1/14/19 for consideration of EPS/ RFA for her known Afib. She continued to complain of shortness of breath, fatigue, dizziness and abnormal beats felt predominantly in her neck. She noticed a progression of her shortness of breath over the past year. On 11/1/17, she saw Dr. Lulu Ybarra for consideration of an atrial fibrillation ablation-- she was scheduled but it was shortly thereafter cancelled due to abnormal CTA results. The results revealed mediastinal lymphadenopathy and ground glass opacities of the lung. A pulmonary consult and bronchoscopy were recommended. She followed up with Dr. Franky Taylor (pulmonologist at [facility]) who stated that she was stable/ unchanged and no further workup was indicated. She was once again interested in pursuing the ablation.      She underwent a successful DCCV on 1/16/19 and was started on Multaq 400 mg BID and her Coreg was decreased. She was founng to be back in A-fib at her follow up visit 2/1/19. She had another cardioversion 2/8/19 and was started on amiodarone. She is scheduled for an ablation in the near future.      Interval history:   Today, she arrives for a follow up s/p DCCV. She endorses fatigue and feeling lightheadedness since starting the amiodarone. Patient denies exertional chest pain/pressure, dyspnea at rest, VELASQUEZ, PND, orthopnea, palpitations, lightheadedness, weight changes, changes in LE edema, and syncope. Patient reports compliance to his/her medications. She denies any abnormal bruising or bleeding.      Past Medical History        Past Medical History:   Diagnosis Date    A-fib ([facility] [facility] 2014     new onset    Cardiomyopathy ([facility] [facility]      Chest pain       patent coronaries 06    COPD [facility]chronic obstructive pulmonary disease) ([facility])      Hypertension      IHSS (idiopathic hypertrophic subaortic stenosis) ([facility])       Echo 6/10 normal LVEF, no mention of IHSS    Mitral insufficiency       echo 6/10 mild-mod MR    VARUN (obstructive sleep apnea)       on CPAP    Palpitations       stable    Tobacco abuse       cessation discussed            Past Surgical History         Past Surgical History:   Procedure Laterality Date    CARDIAC CATHETERIZATION        CARDIOVERSION        HAND SURGERY        HYSTERECTOMY                Allergies: Allergies   Allergen Reactions    Percocet [Oxycodone-Acetaminophen] Itching         Medication:   Home Medications   Prior to Admission medications    Medication Sig Start Date End Date Taking? Authorizing Provider   carvedilol (COREG) 25 MG tablet Take 1 tablet by mouth 2 times daily 2/1/19   Yes Stefan Falcon MD   XARELTO 20 MG TABS tablet TAKE ONE TABLET, BY MOUTH, DAILY.  12/10/18   Yes Connie Berry MD   furosemide (LASIX) 20 MG tablet Take 1 tablet by mouth daily 11/9/18   Yes Connie Berry MD   Albuterol (VENTOLIN IN) Inhale into the lungs     Yes Historical Provider, MD   losartan-hydrochlorothiazide (HYZAAR) 100-25 MG per tablet Take 1 tablet by mouth daily     Yes Historical Provider, MD   Budesonide-Formoterol Fumarate (SYMBICORT IN) Inhale into the lungs     Yes Historical Provider, MD ACETAMINOPHEN PO Take by mouth     Yes Historical Provider, MD   Doxylamine Succinate, Sleep, (SLEEP AID PO) Take by mouth     Yes Historical Provider, MD   rOPINIRole (REQUIP) 0.5 MG tablet Take 0.5 mg by mouth every evening     Yes Historical Provider, MD   clotrimazole-betamethasone (LOTRISONE) 1-0.05 % cream Apply topically 2 times daily Apply topically 2 times daily.     Yes Historical Provider, MD   cyclobenzaprine (FLEXERIL) 10 MG tablet Take 10 mg by mouth nightly as needed for Muscle spasms     Yes Historical Provider, MD   fluticasone (FLONASE) 50 MCG/ACT nasal spray 1 spray by Nasal route daily     Yes Historical Provider, MD   cloNIDine (CATAPRES) 0.2 MG tablet Take 0.2 mg by mouth 2 times daily     Yes Historical Provider, MD   Omega-3 Fatty Acids (FISH OIL) 1000 MG CAPS Take 1,000 mg by mouth daily      Yes Historical Provider, MD   gabapentin (NEURONTIN) 100 MG capsule Take 200 mg by mouth nightly. .     Yes Historical Provider, MD   pantoprazole (PROTONIX) 40 MG tablet Take 40 mg by mouth 2 times daily.     Yes Historical Provider, MD   vitamin E 1000 UNIT capsule Take 400 Units by mouth daily.     Yes Historical Provider, MD            Social History:   reports that she has been smoking. She has a 20.00 pack-year smoking history. She has never used smokeless tobacco. She reports that she drinks about 2.4 oz of alcohol per week . She reports that she does not use drugs.         Family History:  family history includes Heart Disease in her father; Kidney Disease in her father and mother. Reviewed.  Denies family history of sudden cardiac death, arrhythmia, premature CAD     Review of System:     · General ROS: negative for - chills, fever   · Psychological ROS: negative for - anxiety or depression  · Ophthalmic ROS: negative for - eye pain or loss of vision  · ENT ROS: negative for - epistaxis, headaches, nasal discharge, sore throat   · Allergy and Immunology ROS: negative for - hives, nasal congestion   · Hematological and Lymphatic ROS: negative for - bleeding problems, blood clots, bruising or jaundice  · Endocrine ROS: negative for - skin changes, temperature intolerance or unexpected weight changes  · Respiratory ROS: negative for - cough, hemoptysis, pleuritic pain, sputum changes or wheezing +chronic SOB  · Cardiovascular ROS: Per HPI. · Gastrointestinal ROS: negative for - abdominal pain, blood in stools, diarrhea, hematemesis, melena, na           usea/vomiting or         swallowing difficulty/pain +obese  · Genito-Urinary ROS: negative for - dysuria or incontinence  · Musculoskeletal ROS: negative for - joint swelling or muscle pain  · Neurological ROS: negative for - confusion, dizziness, gait disturbance, headaches, numbness/tingling,           seizures, speech         problems, tremors, visual changes or weakness  · Dermatological ROS: negative for - rash     Physical Examination:      Vitals:     02/27/19 1647   BP: 114/60   Pulse: 70   SpO2: 97%         · Constitutional: Oriented. No distress. · Head: Normocephalic and atraumatic. · Mouth/Throat: Oropharynx is clear and moist.   · Eyes: Conjunctivae normal. EOM are normal.   · Neck: Normal range of motion. Neck supple. No rigidity. No JVD present. · Cardiovascular: Irregular, S1&S2 and intact distal pulses. · Pulmonary/Chest: Bilateral respiratory sounds. No wheezes. No rhonchi. · Abdominal: Soft. Bowel sounds present. No distension, No tenderness. · Musculoskeletal: No tenderness. No edema    · Lymphadenopathy: Has no cervical adenopathy. · Neurological: Alert and oriented. Cranial nerve appears intact, No Gross deficit   · Skin: Skin is warm and dry. No rash noted. · Psychiatric: Has a normal mood, affect and behavior      Labs:  Reviewed.      ECG: reviewed, 2/27/19 Atrial fibrillation      Studies:   1.  Event monitor: 4/2012 [facility])  NSR     2. 48 Holter Monitor: 10/2017 [facility])  Symptomatic Afib HR      3. Echo: 1/9/19 ([facility])  LVEF 60-65%, hypertrophic CM  Severe concentric hypertrophy of LV  LA volume 43.2 ml/m, LA 28.9 cm  IVsd 1.89 cm     4. Stress Test:  10/2017 [facility])  Normal scan, non-diagnostic EKG     5. Cath: 2006  Patent coronaries     The MCOT, echocardiogram, stress test, and coronary angiography/PCI were reviewed by myself and used for my plan of care. Procedures:  1. Successful DCCV 3/2014  2. Successful DCCV 10/2014  3. Successful DCCV on 1/16/19  4. Successful DCCV on 2/8/19     Assessment/Plan:      HOCM:  -Reported by ECHO  -On medical management  -IVsd 1.89 cm     Afib:  -Persistent, long-standing history  -Symptomatic, reports fatigue   -Likely secondary to HOCM  -S/p DCCV on 2/8/19 (success was short lived). Per EKG, in AFib today  -Currently on Amiodarone 200 mg daily and Coreg 12.5 mg BID   -Since the amiodarone is not working, I have instructed her to discontinue today.   -Not an ideal candidate for amiodarone due to lung disease (was on it at one time, d/c'd 5/2016)  -CHADS Vasc 2 (female, HTN)- currently on Xarelto 20 mg daily  -Treatment options including cardioversion, anti-arrhythmic medication therapy, rate control strategy, oral anticoagulation, and atrial fibrillation ablation were discussed with patient. Risks, benefits and alternative of each treatment options were explained. All questions answered.      -We educated the patient that atrial fibrillation is a progressive disease, with more frequent episodes that will ensue.  Subsequent episodes usually become more sustained to the extent that many individuals would then develop persistent atrial fibrillation. Once persistence is reached, permanent atrial fibrillation is inevitable. We discussed different management options for atrial fibrillation including their risks and benefits.  These options include use of cardioversion (mainly for persisting atrial fibrillation or atrial flutter) which provides an effective immediate therapy with success rates of 75% or higher, but it provides no short nor long term efficacy.  Anti-arrhythmic medications provide a very effective short term therapy, but even with our most potent anti-arrhythmic medication there is limited long term efficacy (clinical studies have shown that 40% of patients remain atrial fibrillation-free after 4 years of follow-up after starting one of the more powerful anti-arrhythmic medication (amiodarone), and, if extrapolated, may have further diminishing success as time goes on).   Atrial fibrillation ablation is a potentially curative therapy with very reasonable success rate after a first time procedure and with improving success rates with subsequent procedures.      The risks, benefits and alternatives of the ablation procedure were discussed with the patient. The risks including, but not limited to, the risks of bleeding, infection, radiation exposure, injury to vascular, cardiac and surrounding structures (including pneumothorax), stroke, cardiac perforation, tamponade, need for emergent open heart surgery, need for pacemaker implantation, injury to the phrenic nerve, injury to the esophagus, myocardial infarction and death were discussed in detail.      The patient opted to proceed with the ablation. Will schedule for RF ablation with Carto Navigation system. The patient already has a cardiac CTA from 1/2018 (when she was attempting to have the ablation with Dr. Ramses Chris) and we will be utilizing that CTA for our procedure. Hold Xarelto for 2 days prior to procedure. Will order BMP, CBC, PT/INR, and Type & Screen prior to the procedure.      Thank you for allowing me to participate in the care of [address]. All questions and concerns were addressed to the patient/family. Alternatives to my treatment were discussed.      I have reviewed the history and physical and examined the patient and find no relevant changes.  I have reviewed with the patient and/or family the risks, benefits, and alternatives to the procedure.       Maulik Awad MD, [address], [place] [address] Electrophysiology  [address]  [address] [facility], [address] [facility]  Janak Zhou [facility] [address]  [phone]

## 2019-05-21 NOTE — ANESTHESIA PRE PROCEDURE
by mouth      Doxylamine Succinate, Sleep, (SLEEP AID PO) Take by mouth      rOPINIRole (REQUIP) 0.5 MG tablet Take 0.5 mg by mouth every evening      clotrimazole-betamethasone (LOTRISONE) 1-0.05 % cream Apply topically 2 times daily Apply topically 2 times daily.  cyclobenzaprine (FLEXERIL) 10 MG tablet Take 10 mg by mouth nightly as needed for Muscle spasms      fluticasone (FLONASE) 50 MCG/ACT nasal spray 1 spray by Nasal route daily      cloNIDine (CATAPRES) 0.2 MG tablet Take 0.2 mg by mouth 2 times daily      Omega-3 Fatty Acids (FISH OIL) 1000 MG CAPS Take 1,000 mg by mouth daily       gabapentin (NEURONTIN) 100 MG capsule Take 200 mg by mouth nightly. Chales Haste pantoprazole (PROTONIX) 40 MG tablet Take 40 mg by mouth 2 times daily.  vitamin E 1000 UNIT capsule Take 400 Units by mouth daily. Current Facility-Administered Medications   Medication Dose Route Frequency Provider Last Rate Last Dose    0.9 % sodium chloride infusion   Intravenous Continuous Mary Odell MD        sodium chloride flush 0.9 % injection 10 mL  10 mL Intravenous PRN Mary Odell MD        sodium chloride flush 0.9 % injection 10 mL  10 mL Intravenous 2 times per day Mary Odell MD         Vital Signs (Current) There were no vitals filed for this visit. Vital Signs Statistics (for past 48 hrs)     No data recorded    BP Readings from Last 3 Encounters:   02/27/19 114/60   02/01/19 (!) 140/84   01/14/19 126/82     BMI  There is no height or weight on file to calculate BMI. Estimated body mass index is 35.35 kg/m² as calculated from the following:    Height as of 2/27/19: 5' 6\" (1.676 m). Weight as of 2/27/19: 219 lb (99.3 kg).     CBC   Lab Results   Component Value Date    WBC 6.9 02/08/2019    RBC 4.86 02/08/2019    HGB 15.6 02/08/2019    HCT 44.9 02/08/2019    MCV 92.5 02/08/2019    RDW 14.2 02/08/2019     02/08/2019     CMP    Lab Results   Component Value Date     02/08/2019    K 3.9 02/08/2019    CL 98 02/08/2019    CO2 26 02/08/2019    BUN 27 02/08/2019    CREATININE 1.1 02/08/2019    GFRAA >60 02/08/2019    LABGLOM 50 02/08/2019    GLUCOSE 70 02/08/2019    CALCIUM 9.5 02/08/2019     BMP    Lab Results   Component Value Date     02/08/2019    K 3.9 02/08/2019    CL 98 02/08/2019    CO2 26 02/08/2019    BUN 27 02/08/2019    CREATININE 1.1 02/08/2019    CALCIUM 9.5 02/08/2019    GFRAA >60 02/08/2019    LABGLOM 50 02/08/2019    GLUCOSE 70 02/08/2019     POCGlucose  No results for input(s): GLUCOSE in the last 72 hours. Coags    Lab Results   Component Value Date    PROTIME 17.5 02/08/2019    INR 1.54 75/48/2105     HCG (If Applicable) No results found for: PREGTESTUR, PREGSERUM, HCG, HCGQUANT   ABGs No results found for: PHART, PO2ART, EHZ0HQO, HFT0PIF, BEART, E5TQCOZB   Type & Screen (If Applicable)  No results found for: LABABO, LABRH                         BMI: Wt Readings from Last 3 Encounters:       NPO Status:  >8h                          Anesthesia Evaluation  Patient summary reviewed no history of anesthetic complications:   Airway: Mallampati: II  TM distance: >3 FB     Mouth opening: > = 3 FB Dental:          Pulmonary: breath sounds clear to auscultation  (+) COPD:  shortness of breath:  sleep apnea: on CPAP,                             Cardiovascular:  Exercise tolerance: good (>4 METS),   (+) hypertension:,     (-) past MI, CABG/stent and dysrhythmias      Rhythm: regular  Rate: normal                 ROS comment: Subaortic stenosis     Neuro/Psych:      (-) seizures, TIA and CVA           GI/Hepatic/Renal:        (-) GERD, hepatitis, liver disease and no renal disease       Endo/Other:        (-) diabetes mellitus, hypothyroidism               Abdominal:           Vascular:     - DVT and PE. Anesthesia Plan      general     ASA 3       Induction: intravenous.     MIPS: Postoperative opioids intended and Prophylactic antiemetics administered. Anesthetic plan and risks discussed with patient. Plan discussed with CRNA. This pre-anesthesia assessment may be used as a history and physical.    DOS STAFF ADDENDUM:    Pt seen and examined, chart reviewed (including anesthesia, drug and allergy history). No interval changes to history and physical examination. Anesthetic plan, risks, benefits, alternatives, and personnel involved discussed with patient. Patient verbalized an understanding and agrees to proceed.       Cary Alba MD  May 21, 2019  7:15 AM

## 2019-05-21 NOTE — PROCEDURES
[employer]     Electrophysiology Procedure Note       Date of Procedure: 5/21/2019  Patient's Name: Zack Ganser  YOB: 1955   Medical Record Number: [id number]  Referring Physician: Nicole Peeling  Procedure Performed by: Shelle Simmonds, MD    Procedure performed:  · Electrophysiology study with radiofrequency ablation of atrial fibrillation and pulmonary vein isolation   · Additional ablation with creation of a roof line, mid-posterior wall box, and mitral isthmus  · 3-D electroanatomical mapping of the left atrium    · Transseptal puncture through an intact septum x 2 under intracardiac ultrasound guidance without fluoroscopic guidance   · Intracardiac echocardiography. · External cardioversion of the atrial arrhythmias x 2  · Anesthesia: General anesthesia provided by the Anesthesia service  · (there were no independent trained observers who had no other duties involved in this procedure)      Indications for procedure: Symptomatic atrial fibrillation, failed antiarrhythmic therapy and cardioversion in the past   Zack Ganser is a 61 y.o. female who has a history of persistent, long standing atrial fibrillation who is symptomatic with symptoms of dyspnea with minimal exertion and fatigue who has failed antiarrhythmic therapy in the past is now here for an ablation for persistent atrial fibrillation. Details of Procedure: The risks, benefits and alternatives of the ablation procedure were discussed with the patient. The risks including, but not limited to, the risks of bleeding, infection, radiation exposure, injury to vascular, cardiac and surrounding structures (including pneumothorax), stroke, cardiac perforation, tamponade, need for emergent open heart surgery, need for pacemaker implantation, esophageal injury and fistula, myocardial infarction and death were discussed in detail. The patient opted to proceed with the ablation.  Written informed consent was signed and placed in the chart. Patient was brought to the EP lab in a fasting non-sedate state. Patient underwent general anesthesia by anesthesia team. The patient was monitored continuously with ECG, pulse oximetry, blood pressure monitoring, and direct observation. We initially performed a transesophageal echo that showed no left atrial appendage clot/thrombus. Both groins were prepped in a sterile fashion. We gained access in the right femoral vein. One 8 Bhutanese short sheath for ICE and subsequently for CS catheters were placed in the right femoral vein using modified seldinger technique. Two 8.5F SLO sheaths were introduced into the right femoral vein using modified Seldinger technique. Then a CS cathter was placed inside the coronary sinus under fluoroscopy for recording and mapping of the left atrium. Using ICE we delineated the left pulmonary vein, left atrial appendage,  mitral valve, and right superior and right inferior pulmonary veins, and the trivial amount of pericardial effusion prior to ablation. Two transeptal punctures were performed under intracardiac echocardiogram, pressure monitoring, and without fluoroscopic guidance. Patient received a bolus of heparin shortly after each transseptal puncture followed by continuous monitoring of the ACT every 15-30 minutes, and additional boluses of heparin during the procedure to keep the ACT between 300-400 sec. We placed both SLO sheaths inside the left atrium. Also an esophageal temperature probe ([place] Temperature Probe 12Fr) that was tied with sutures to an accompanying [facility] [facility] quadripolar 6 Bh[place]anese 5-5-5 electrode spacing catheter was advanced into the esophagus for real-time mapping of the esophagus and careful monitoring of the esophageal temperature during ablation.       Using the Penta ray cathter and Carto navigation system a three dimensional electro anatomical mapping of the left atrium, in addition to right and left sided pulmonary block, as evidenced by a sideways \"chevron\" patter on the CS catheter when pacing from the LA appendage. Adenosine bolus of 18mg was also given for each of the 4 pulmonary veins to assess for acute re-connection and to attempt to induce atrial fibrillation. We had adequate adenosine effect (AV blocks of > 3 seconds) and there were no pulmonary fascicles seen in any of the veins. At one point, atrial fibrillation was re-induced by adenosine bolus and the patient required another 200J of synchronized, biphasic shock that successfully converted the patient to sinus rhythm. We then performed an EP study and programmed stimulation using our CS catheter and ablation cathter to assess the cardiac conduction system and to attempt to induce atrial tachydysrhythmia. The ablation catheter were moved from the left atrium to the left ventricle and His bundle position. His bundle potentials was recorded and pacing was performed from right atrium, coronary sinus and LV apex with the following results:     Sinus cycle length was 1126 msec  NH interval was 189 msec  QRS duration was 86 msec  QT interval was 538 msec  AH interval was 80 msec  HV interval was 58 msec  Pacing from left atrium, 1:1 conduction over AV node with (AV wenckebach) was 510  msec  Pacing from left atrium, AV zia ERP was 600/450 msec   Pacing from LV apex, 1:1 retrograde conduction over AV node (VA wenckebach) was attempted but showed VA dissociation       Then both the ablation, Pentarray, and CS catheters were removed from the body, and all 3 sheaths were removed from the right femoral vein with a figure-of-eight suture that was placed to provide hemostasis while awaiting the downtrending of the ACT. Under intracardiac ultrasound guidance, we evaluated for pericardail effusion, and there was no evidence of such. The patient tolerated the procedure well and there were no complications.  Patient was extubated and transferred to the floor in stable

## 2019-05-22 VITALS
HEART RATE: 63 BPM | SYSTOLIC BLOOD PRESSURE: 130 MMHG | DIASTOLIC BLOOD PRESSURE: 66 MMHG | TEMPERATURE: 97.9 F | OXYGEN SATURATION: 90 % | RESPIRATION RATE: 18 BRPM | WEIGHT: 213.85 LBS | HEIGHT: 66 IN | BODY MASS INDEX: 34.37 KG/M2

## 2019-05-22 LAB
EKG ATRIAL RATE: 65 BPM
EKG DIAGNOSIS: NORMAL
EKG P AXIS: 67 DEGREES
EKG P-R INTERVAL: 186 MS
EKG Q-T INTERVAL: 526 MS
EKG QRS DURATION: 108 MS
EKG QTC CALCULATION (BAZETT): 547 MS
EKG R AXIS: 20 DEGREES
EKG T AXIS: 194 DEGREES
EKG VENTRICULAR RATE: 65 BPM

## 2019-05-22 PROCEDURE — 94660 CPAP INITIATION&MGMT: CPT

## 2019-05-22 PROCEDURE — 94640 AIRWAY INHALATION TREATMENT: CPT

## 2019-05-22 PROCEDURE — 99239 HOSP IP/OBS DSCHRG MGMT >30: CPT | Performed by: NURSE PRACTITIONER

## 2019-05-22 PROCEDURE — 94761 N-INVAS EAR/PLS OXIMETRY MLT: CPT

## 2019-05-22 PROCEDURE — 6370000000 HC RX 637 (ALT 250 FOR IP): Performed by: INTERNAL MEDICINE

## 2019-05-22 RX ORDER — FLECAINIDE ACETATE 50 MG/1
50 TABLET ORAL EVERY 12 HOURS SCHEDULED
Qty: 60 TABLET | Refills: 3 | Status: SHIPPED | OUTPATIENT
Start: 2019-05-22 | End: 2019-09-17 | Stop reason: SDUPTHER

## 2019-05-22 RX ADMIN — HYDROCHLOROTHIAZIDE 25 MG: 25 TABLET ORAL at 07:57

## 2019-05-22 RX ADMIN — LOSARTAN POTASSIUM 100 MG: 50 TABLET ORAL at 07:57

## 2019-05-22 RX ADMIN — PANTOPRAZOLE SODIUM 40 MG: 40 TABLET, DELAYED RELEASE ORAL at 07:57

## 2019-05-22 RX ADMIN — FUROSEMIDE 20 MG: 20 TABLET ORAL at 07:56

## 2019-05-22 RX ADMIN — CARVEDILOL 25 MG: 25 TABLET, FILM COATED ORAL at 07:57

## 2019-05-22 RX ADMIN — FLECAINIDE ACETATE 50 MG: 100 TABLET ORAL at 07:57

## 2019-05-22 RX ADMIN — Medication: at 08:06

## 2019-05-22 RX ADMIN — CLONIDINE HYDROCHLORIDE 0.2 MG: 0.1 TABLET ORAL at 07:57

## 2019-05-22 RX ADMIN — MOMETASONE FUROATE AND FORMOTEROL FUMARATE DIHYDRATE 2 PUFF: 200; 5 AEROSOL RESPIRATORY (INHALATION) at 09:26

## 2019-05-22 ASSESSMENT — PAIN SCALES - GENERAL: PAINLEVEL_OUTOF10: 0

## 2019-05-22 NOTE — PROGRESS NOTES
0700: Handoff report received from [address] [employer] Pt seen and groin site was checked. Some excoriation remains possibly a tape burn. Puncture site covered with dry dressing c,d,i. Pt reports tenderness to touch. 0745: Dr. Memo De speaking with patient via phone. Pt up in recliner eating breakfast.   0802: Assessment complete. R groin site intact, remains tender to touch. Heart rhythm is NSR.  0825: Marisela GODFREY-DANIEL bedside to discuss discharge. 1000: AVS prepared, printed and reviewed with patient. PIV and tele monitor removed. Pt dressing in street clothing. 1030: Pt  present. Pt was escorted to discharge bridge by PCA.

## 2019-05-22 NOTE — PROGRESS NOTES
05/21/2019    1923 - Bedside shift hand-off done w/ off-going HEMA Rowe. Pt. is alert and oriented, sitting on the bedside recliner, SR on the monitor, on room air, ambulatory, not in any distress, and w/ R groin has a figure-of-eight stitch and is WNL - soft, no active oozing, no hematoma, just tender to touch. 2016 - Shift assessment completed. Plan of care was explained to her and she verbalized understanding. 2230 - Still sitting and is watching TV.    2320 - Tolerated walking 350 ft. in the hallways. R groin remained unchanged. 2330 - Now lying in bed and has her home CPAP on. No other needs right now. Call light, bedside table, and personal items are still in reach. 05/22/2019    0233 - Asleep w/ easy and even respirations. 0400 - Still asleep. 3569 - Awakened for reassessment. Reassessment unchanged. R groin is still soft, without any active oozing, nor any hematoma, but is tender to touch. She denies any pain and SOB.    0507 - Walked to the bathroom to urinate and do oral care. 8078 - Sat on the bedside recliner. Denies any needs. 4173 - Back to bed. Figure-of-eight stitch removed from the R groin. No active bleeding after the stitch removal. Site was cleansed w/ ChloraPrep and a sterile gauze & Tegaderm were placed over the site. 3904 - Bedside shift hand-off done w/ oncoming HEMA Martinez Standard to assume pt. care. R groin remained unchanged - soft, no oozing, nor any hematoma. Pt. was assisted back to the bedside recliner. Call light, bedside table, and personal items are still in reach.     Electronically signed by Crescencio Diaz RN on 5/22/2019 at 7:41 AM

## 2019-05-22 NOTE — ANESTHESIA POSTPROCEDURE EVALUATION
Department of Anesthesiology  Postprocedure Note    Patient: Estela Chakraborty  MRN: [id number]  YOB: 1955  Date of evaluation: 5/21/2019  Time:  10:33 PM     Procedure Summary     Date:  05/21/19 Room / Location:  [facility] Cath Lab    Anesthesia Start:  5280 Anesthesia Stop:  0334    Procedure:  WST LEXI AFIB ABLATION W ANES Diagnosis:  Persistent atrial fibrillation ([address]    Scheduled Providers:   Responsible Provider:  Bryon Newby MD    Anesthesia Type:  general ASA Status:  3          Anesthesia Type: general    Jack Phase I:      Jack Phase II:      Last vitals: Reviewed and per EMR flowsheets.        Anesthesia Post Evaluation    Patient location during evaluation: PACU  Patient participation: complete - patient participated  Level of consciousness: awake and alert  Pain score: 3  Airway patency: patent  Nausea & Vomiting: no nausea and no vomiting  Complications: no  Cardiovascular status: blood pressure returned to baseline  Respiratory status: acceptable  Hydration status: euvolemic

## 2019-05-22 NOTE — PLAN OF CARE
Problem: Bleeding:  Goal: Will show no signs and symptoms of excessive bleeding  Description  Will show no signs and symptoms of excessive bleeding  Outcome: Ongoing  A: Assess for S/Sx of bleeding. Educate pt. on bleeding precautions. Inform MD of any uncontrolled bleeding and pt. instability. Problem: Pain:  Goal: Control of acute pain  Description  Control of acute pain  Outcome: Ongoing  A: Pain scale. Non-pharmacological interventions utilization. Administering PRN pain meds. Pain reassessment after an hour of intervention. Informing MD of unmanaged pain. Problem: Falls - Risk of:  Goal: Will remain free from falls  Description  Will remain free from falls  Outcome: Ongoing  A: Fall prevention education. Bed locked at the lowest position. Recliner wheels locked in place. Non-skid socks on. Call light, personal items, and bedside table are in reach. Room uncluttered. Intentional rounding.     Electronically signed by Gera Newby RN on 5/22/2019 at 1:27 AM

## 2019-05-22 NOTE — DISCHARGE SUMMARY
(REQUIP) 0.5 MG tablet Take 0.5 mg by mouth every evening   Yes Historical Provider, MD   cyclobenzaprine (FLEXERIL) 10 MG tablet Take 10 mg by mouth nightly as needed for Muscle spasms   Yes Historical Provider, MD   fluticasone (FLONASE) 50 MCG/ACT nasal spray 1 spray by Nasal route daily   Yes Historical Provider, MD   cloNIDine (CATAPRES) 0.2 MG tablet Take 0.2 mg by mouth 2 times daily   Yes Historical Provider, MD   Omega-3 Fatty Acids (FISH OIL) 1000 MG CAPS Take 1,000 mg by mouth daily    Yes Historical Provider, MD   gabapentin (NEURONTIN) 100 MG capsule Take 200 mg by mouth nightly. .   Yes Historical Provider, MD   pantoprazole (PROTONIX) 40 MG tablet Take 40 mg by mouth 2 times daily. Yes Historical Provider, MD   vitamin E 1000 UNIT capsule Take 400 Units by mouth daily. Yes Historical Provider, MD   Albuterol (VENTOLIN IN) Inhale into the lungs    Historical Provider, MD   clotrimazole-betamethasone (LOTRISONE) 1-0.05 % cream Apply topically 2 times daily Apply topically 2 times daily. Historical Provider, MD     Activity: See discharge instructions,  Diet: cardiac diet. Wound Care: See discharge instructions. Follow-up with Dr. Liza Bloch at scheduled appointment. Call [phone] with any questions. Signed:  Marisela Potter  5/22/2019  9:11 AM    Greater than 35 minutes total spent on discharge.

## 2019-06-03 ENCOUNTER — TELEPHONE (OUTPATIENT)
Dept: CARDIOLOGY CLINIC | Age: 64
End: 2019-06-03

## 2019-07-10 ENCOUNTER — OFFICE VISIT (OUTPATIENT)
Dept: CARDIOLOGY CLINIC | Age: 64
End: 2019-07-10
Payer: COMMERCIAL

## 2019-07-10 VITALS
HEART RATE: 68 BPM | WEIGHT: 214 LBS | BODY MASS INDEX: 34.39 KG/M2 | DIASTOLIC BLOOD PRESSURE: 68 MMHG | SYSTOLIC BLOOD PRESSURE: 120 MMHG | HEIGHT: 66 IN | OXYGEN SATURATION: 96 %

## 2019-07-10 DIAGNOSIS — I42.9 PRIMARY CARDIOMYOPATHY (HCC): ICD-10-CM

## 2019-07-10 DIAGNOSIS — I48.19 PERSISTENT ATRIAL FIBRILLATION (HCC): Primary | ICD-10-CM

## 2019-07-10 DIAGNOSIS — I50.32 CHRONIC DIASTOLIC CONGESTIVE HEART FAILURE (HCC): ICD-10-CM

## 2019-07-10 PROCEDURE — 93000 ELECTROCARDIOGRAM COMPLETE: CPT | Performed by: INTERNAL MEDICINE

## 2019-07-10 PROCEDURE — 99214 OFFICE O/P EST MOD 30 MIN: CPT | Performed by: INTERNAL MEDICINE

## 2019-07-10 ASSESSMENT — ENCOUNTER SYMPTOMS
NAUSEA: 0
EYE REDNESS: 0
WHEEZING: 0
SHORTNESS OF BREATH: 0
BLOOD IN STOOL: 0
COUGH: 0

## 2019-07-10 NOTE — PATIENT INSTRUCTIONS
· You have symptoms of a stroke. These may include:  ? Sudden numbness, tingling, weakness, or loss of movement in your face, arm, or leg, especially on only one side of your body. ? Sudden vision changes. ? Sudden trouble speaking. ? Sudden confusion or trouble understanding simple statements. ? Sudden problems with walking or balance. ? A sudden, severe headache that is different from past headaches.     · You passed out (lost consciousness).    Call your doctor now or seek immediate medical care if:    · You have new or increased shortness of breath.     · You feel dizzy or lightheaded, or you feel like you may faint.     · Your heart rate becomes irregular.     · You can feel your heart flutter in your chest or skip heartbeats. Tell your doctor if these symptoms are new or worse.    Watch closely for changes in your health, and be sure to contact your doctor if you have any problems. Where can you learn more? Go to https://Stamped.HeadSprout. org and sign in to your BiddingForGood account. Enter U020 in the KnowFu box to learn more about \"Atrial Fibrillation: Care Instructions. \"     If you do not have an account, please click on the \"Sign Up Now\" link. Current as of: July 22, 2018  Content Version: 12.0  © 5810-7964 Healthwise, Incorporated. Care instructions adapted under license by [facility] [place] ([facility]). If you have questions about a medical condition or this instruction, always ask your healthcare professional. [place] [address] any warranty or liability for your use of this information.

## 2019-07-24 NOTE — TELEPHONE ENCOUNTER
Patient scheduled. Implemented All Universal Safety Interventions:  [place] to call system. Call bell, personal items and telephone within reach. Instruct patient to call for assistance. Room bathroom lighting operational. Non-slip footwear when patient is off stretcher. Physically safe environment: no spills, clutter or unnecessary equipment. Stretcher in lowest position, wheels locked, appropriate side rails in place.

## 2019-07-26 ENCOUNTER — TELEPHONE (OUTPATIENT)
Dept: CARDIOLOGY CLINIC | Age: 64
End: 2019-07-26

## 2019-07-26 NOTE — TELEPHONE ENCOUNTER
Dr Coni Pollard   Please advise in Dr Tejeda Asa absence. Patient s/p ablation 5/21/19. She is complaining of dizziness and she believes it is due to the flecainide. She passed out yesterday. She has a follow up appointment on 8/28/19 with Dr All Brenner. Any further recommendations?

## 2019-08-05 RX ORDER — CARVEDILOL 25 MG/1
TABLET ORAL
Qty: 180 TABLET | Refills: 3 | Status: SHIPPED | OUTPATIENT
Start: 2019-08-05 | End: 2019-08-07 | Stop reason: SDUPTHER

## 2019-08-07 RX ORDER — CARVEDILOL 25 MG/1
TABLET ORAL
Qty: 180 TABLET | Refills: 3 | Status: SHIPPED | OUTPATIENT
Start: 2019-08-07 | End: 2020-07-31

## 2019-08-27 NOTE — PROGRESS NOTES
[address]   Cardiac Electrophysiology Consultation   Date: 8/27/2019  Reason for Consultation: Afib  Consult Requesting Physician: Earline Zamora MD  Primary Care Physician: Braxton Almanza MD    Chief Complaint:   No chief complaint on file. HPI: Bruna Molina is a 61 y.o. female with a PMH significant for HOCM and diastolic dysfunction, moderate MR, HTN, COPD, VARUN compliant with CPAP, 0.5-1 PPD smoker, persistent/ long-standing atrial fibrillation (dating back to 2014) who is typically followed by Dr. Faith Garcia for her general cardiac needs. She re-establish care with the EP service on 1/14/19 for consideration of EPS/ RFA for her known Afib. She continued to complain of shortness of breath, fatigue, dizziness and abnormal beats felt predominantly in her neck. She noticed a progression of her shortness of breath over the past year. On 11/1/17, she saw Dr. Saran Martin for consideration of an atrial fibrillation ablation-- she was scheduled but it was shortly thereafter cancelled due to abnormal CTA results. The results revealed mediastinal lymphadenopathy and ground glass opacities of the lung. A pulmonary consult and bronchoscopy were recommended. She followed up with Dr. Katharine Phillip (pulmonologist at [facility]) who stated that she was stable/ unchanged and no further workup was indicated. She was once again interested in pursuing the ablation. She underwent a successful DCCV on 1/16/19 and was started on Multaq 400 mg BID and her Coreg was decreased. She was founng to be back in A-fib at her follow up visit 2/1/19. She had another cardioversion 2/8/19 and was started on amiodarone which was later discontinued due to decrease lung function. She subsequently underwent a atrial fibrillation ablation on 5/21/19    Interval history: Today, she arrives for a follow up s/p atrial fibrillation ablation on 5/21/19. EKG today shows atrial flutter rate controlled.  She reports that she has been feeling dizzy and pass · Hematological and Lymphatic ROS: negative for - bleeding problems, blood clots, bruising or jaundice  · Endocrine ROS: negative for - skin changes, temperature intolerance or unexpected weight changes  · Respiratory ROS: negative for - cough, hemoptysis, pleuritic pain, sputum changes or wheezing + chronic shortness of breath. · Cardiovascular ROS: Per HPI. · Gastrointestinal ROS: negative for - abdominal pain, blood in stools, diarrhea, hematemesis, melena, na usea/vomiting or  swallowing difficulty/pain +obese  · Genito-Urinary ROS: negative for - dysuria or incontinence  · Musculoskeletal ROS: negative for - joint swelling or muscle pain  · Neurological ROS: negative for - confusion, dizziness, gait disturbance, headaches, numbness/tingling,  seizures, speech problems, tremors, visual changes or weakness  · Dermatological ROS: negative for - rash    Physical Examination:  There were no vitals filed for this visit. · Constitutional: Oriented. No distress. · Head: Normocephalic and atraumatic. · Mouth/Throat: Oropharynx is clear and moist.   · Eyes: Conjunctivae normal. EOM are normal.   · Neck: Normal range of motion. Neck supple. No rigidity. No JVD present. · Cardiovascular: Irregular, S1&S2 and intact distal pulses. · Pulmonary/Chest: Bilateral respiratory sounds. No wheezes. No rhonchi. · Abdominal: Soft. Bowel sounds present. No distension, No tenderness. · Musculoskeletal: No tenderness. No edema    · Lymphadenopathy: Has no cervical adenopathy. · Neurological: Alert and oriented. Cranial nerve appears intact, No Gross deficit   · Skin: Skin is warm and dry. No rash noted. · Psychiatric: Has a normal mood, affect and behavior     Labs:  Reviewed. ECG: reviewed, 8/28/19 Atrial fibrillation v-rate 61 bpm.    Studies:   1. Event monitor: 4/2012 [facility])  NSR    2. 48 Holter Monitor: 10/2017 [facility])  Symptomatic Afib HR     3.  Echo: 1/9/19 ([facility])  LVEF 60-65%, hypertrophic for EKG and 3 months after left atrial flutter. Thank you for allowing me to participate in the care of [address]. All questions and concerns were addressed to the patient/family. Alternatives to my treatment were discussed. This note was scribed in the presence of Chinedu Meyer MD by Iqra Cochran RN. The scribe's documentation has been prepared under my direction and personally reviewed by me in its entirety. I confirm that the note above accurately reflects all work, physical examination, the discussion of treatments and procedures, and medical decision making performed by me.     Chinedu Meyer MD, MS, [facility], [facility]  Cardiac Electrophysiology  [address]  [address] [facility], [address] [place] [address]  Janak Zhou [facility] [address]  [phone]

## 2019-08-28 ENCOUNTER — OFFICE VISIT (OUTPATIENT)
Dept: CARDIOLOGY CLINIC | Age: 64
End: 2019-08-28
Payer: COMMERCIAL

## 2019-08-28 VITALS
WEIGHT: 209 LBS | DIASTOLIC BLOOD PRESSURE: 64 MMHG | BODY MASS INDEX: 33.59 KG/M2 | SYSTOLIC BLOOD PRESSURE: 106 MMHG | OXYGEN SATURATION: 96 % | HEIGHT: 66 IN | HEART RATE: 67 BPM

## 2019-08-28 DIAGNOSIS — I48.92 ATRIAL FLUTTER, UNSPECIFIED TYPE (HCC): Primary | ICD-10-CM

## 2019-08-28 DIAGNOSIS — I48.19 PERSISTENT ATRIAL FIBRILLATION (HCC): ICD-10-CM

## 2019-08-28 PROCEDURE — 99215 OFFICE O/P EST HI 40 MIN: CPT | Performed by: INTERNAL MEDICINE

## 2019-08-28 PROCEDURE — 93000 ELECTROCARDIOGRAM COMPLETE: CPT | Performed by: INTERNAL MEDICINE

## 2019-08-28 NOTE — PATIENT INSTRUCTIONS
less stress on your heart and blood vessels. · Within 12 hours, the level of carbon monoxide in your blood drops back to normal. That makes room for more oxygen. With more oxygen in your body, you may notice that you have more energy than when you smoked. After 2 weeks  · Your lungs start to work better. · Your risk of heart attack starts to drop. After 1 month  · When your lungs are clear, you cough less and breathe deeper, so it's easier to be active. · Your sense of taste and smell return. That means you can enjoy food more than you have since you started smoking. Over the years  · After 1 year, your risk of heart disease is half what it would be if you kept smoking. · After 5 years, your risk of stroke starts to shrink. Within a few years after that, it's about the same as if you'd never smoked. · After 10 years, your risk of dying from lung cancer is cut by about half. And your risk for many other types of cancer is lower too. How would quitting help others in your life? When you quit smoking, you improve the health of everyone who now breathes in your smoke. · Their heart, lung, and cancer risks drop, much like yours. · They are sick less. For babies and small children, living smoke-free means they're less likely to have ear infections, pneumonia, and bronchitis. · If you're a woman who is or will be pregnant someday, quitting smoking means a healthier . · Children who are close to you are less likely to become adult smokers. Where can you learn more? Go to https://1stdibsangel.healthIntention Technology. org and sign in to your Venturocket account. Enter [phone] [id number] [phone] in the [facility] box to learn more about \"Learning About Benefits From Quitting Smoking. \"     If you do not have an account, please click on the \"Sign Up Now\" link. Current as of: 2018  Content Version: 12.1  © 8941-1719 Healthwise, Incorporated. Care instructions adapted under license by [facility] ([facility]).  If you have home?  · Ask your family, friends, and coworkers for support. You have a better chance of quitting if you have help and support. · Join a support group, such as Nicotine Anonymous, for people who are trying to quit smoking. · Consider signing up for a smoking cessation program, such as the American Lung Association's Freedom from Smoking program.  · Get text messaging support. Go to the website at www.smokefree. gov to sign up for the [facility] program.  · Set a quit date. Pick your date carefully so that it is not right in the middle of a big deadline or stressful time. Once you quit, do not even take a puff. Get rid of all ashtrays and lighters after your last cigarette. Clean your house and your clothes so that they do not smell of smoke. · Learn how to be a nonsmoker. Think about ways you can avoid those things that make you reach for a cigarette. ? Avoid situations that put you at greatest risk for smoking. For some people, it is hard to have a drink with friends without smoking. For others, they might skip a coffee break with coworkers who smoke. ? Change your daily routine. Take a different route to work or eat a meal in a different place. · Cut down on stress. Calm yourself or release tension by doing an activity you enjoy, such as reading a book, taking a hot bath, or gardening. · Talk to your doctor or pharmacist about nicotine replacement therapy, which replaces the nicotine in your body. You still get nicotine but you do not use tobacco. Nicotine replacement products help you slowly reduce the amount of nicotine you need. These products come in several forms, many of them available over-the-counter:  ? Nicotine patches  ? Nicotine gum and lozenges  ? Nicotine inhaler  · Ask your doctor about bupropion (Wellbutrin) or varenicline (Chantix), which are prescription medicines. They do not contain nicotine. They help you by reducing withdrawal symptoms, such as stress and anxiety.   · Some people find

## 2019-08-29 ENCOUNTER — TELEPHONE (OUTPATIENT)
Dept: CARDIOLOGY CLINIC | Age: 64
End: 2019-08-29

## 2019-08-29 DIAGNOSIS — I48.92 ATRIAL FLUTTER, UNSPECIFIED TYPE (HCC): Primary | ICD-10-CM

## 2019-08-29 DIAGNOSIS — I48.19 PERSISTENT ATRIAL FIBRILLATION (HCC): ICD-10-CM

## 2019-09-04 DIAGNOSIS — I48.91 ATRIAL FIBRILLATION, UNSPECIFIED TYPE (HCC): Primary | ICD-10-CM

## 2019-09-06 ENCOUNTER — HOSPITAL ENCOUNTER (OUTPATIENT)
Dept: CARDIAC CATH/INVASIVE PROCEDURES | Age: 64
Discharge: HOME | End: 2019-09-06
Payer: COMMERCIAL

## 2019-09-06 VITALS — HEIGHT: 66 IN | BODY MASS INDEX: 33.73 KG/M2

## 2019-09-06 DIAGNOSIS — I48.92 ATRIAL FLUTTER, UNSPECIFIED TYPE (HCC): ICD-10-CM

## 2019-09-06 DIAGNOSIS — I48.19 PERSISTENT ATRIAL FIBRILLATION (HCC): ICD-10-CM

## 2019-09-06 LAB
CALCIUM IONIZED: 1.12 MMOL/L (ref 1.12–1.32)
EKG ATRIAL RATE: 64 BPM
EKG ATRIAL RATE: 75 BPM
EKG DIAGNOSIS: NORMAL
EKG DIAGNOSIS: NORMAL
EKG P AXIS: 76 DEGREES
EKG P-R INTERVAL: 206 MS
EKG Q-T INTERVAL: 476 MS
EKG Q-T INTERVAL: 496 MS
EKG QRS DURATION: 116 MS
EKG QRS DURATION: 122 MS
EKG QTC CALCULATION (BAZETT): 506 MS
EKG QTC CALCULATION (BAZETT): 511 MS
EKG R AXIS: 14 DEGREES
EKG R AXIS: 3 DEGREES
EKG T AXIS: 173 DEGREES
EKG T AXIS: 183 DEGREES
EKG VENTRICULAR RATE: 64 BPM
EKG VENTRICULAR RATE: 68 BPM
GFR AFRICAN AMERICAN: 42
GFR NON-AFRICAN AMERICAN: 35
GLUCOSE BLD-MCNC: 99 MG/DL (ref 70–99)
PERFORMED ON: ABNORMAL
POC CHLORIDE: 97 MMOL/L (ref 99–110)
POC CREATININE: 1.5 MG/DL (ref 0.6–1.2)
POC POTASSIUM: 4.2 MMOL/L (ref 3.5–5.1)
POC SAMPLE TYPE: ABNORMAL
POC SODIUM: 135 MMOL/L (ref 136–145)

## 2019-09-06 PROCEDURE — 99152 MOD SED SAME PHYS/QHP 5/>YRS: CPT | Performed by: INTERNAL MEDICINE

## 2019-09-06 PROCEDURE — 82947 ASSAY GLUCOSE BLOOD QUANT: CPT

## 2019-09-06 PROCEDURE — 82565 ASSAY OF CREATININE: CPT

## 2019-09-06 PROCEDURE — 2500000003 HC RX 250 WO HCPCS

## 2019-09-06 PROCEDURE — 84295 ASSAY OF SERUM SODIUM: CPT

## 2019-09-06 PROCEDURE — 93005 ELECTROCARDIOGRAM TRACING: CPT | Performed by: INTERNAL MEDICINE

## 2019-09-06 PROCEDURE — 84132 ASSAY OF SERUM POTASSIUM: CPT

## 2019-09-06 PROCEDURE — 92960 CARDIOVERSION ELECTRIC EXT: CPT | Performed by: INTERNAL MEDICINE

## 2019-09-06 PROCEDURE — 93010 ELECTROCARDIOGRAM REPORT: CPT | Performed by: INTERNAL MEDICINE

## 2019-09-06 PROCEDURE — 2580000003 HC RX 258

## 2019-09-06 PROCEDURE — 82330 ASSAY OF CALCIUM: CPT

## 2019-09-06 PROCEDURE — 92960 CARDIOVERSION ELECTRIC EXT: CPT | Performed by: FAMILY MEDICINE

## 2019-09-06 PROCEDURE — 82435 ASSAY OF BLOOD CHLORIDE: CPT

## 2019-09-06 RX ORDER — SODIUM CHLORIDE 0.9 % (FLUSH) 0.9 %
10 SYRINGE (ML) INJECTION PRN
Status: DISCONTINUED | OUTPATIENT
Start: 2019-09-06 | End: 2019-09-07 | Stop reason: HOSPADM

## 2019-09-06 RX ORDER — SODIUM CHLORIDE 9 MG/ML
INJECTION, SOLUTION INTRAVENOUS CONTINUOUS
Status: DISCONTINUED | OUTPATIENT
Start: 2019-09-06 | End: 2019-09-07 | Stop reason: HOSPADM

## 2019-09-06 RX ORDER — SODIUM CHLORIDE 0.9 % (FLUSH) 0.9 %
10 SYRINGE (ML) INJECTION EVERY 12 HOURS SCHEDULED
Status: DISCONTINUED | OUTPATIENT
Start: 2019-09-06 | End: 2019-09-07 | Stop reason: HOSPADM

## 2019-09-06 NOTE — H&P
Historical Provider, MD   rOPINIRole (REQUIP) 0.5 MG tablet Take 0.5 mg by mouth every evening   Yes Historical Provider, MD   clotrimazole-betamethasone (LOTRISONE) 1-0.05 % cream Apply topically 2 times daily Apply topically 2 times daily. Yes Historical Provider, MD   cyclobenzaprine (FLEXERIL) 10 MG tablet Take 10 mg by mouth nightly as needed for Muscle spasms   Yes Historical Provider, MD   fluticasone (FLONASE) 50 MCG/ACT nasal spray 1 spray by Nasal route daily   Yes Historical Provider, MD   cloNIDine (CATAPRES) 0.2 MG tablet Take 0.2 mg by mouth 2 times daily   Yes Historical Provider, MD   Omega-3 Fatty Acids (FISH OIL) 1000 MG CAPS Take 1,000 mg by mouth daily    Yes Historical Provider, MD   gabapentin (NEURONTIN) 100 MG capsule Take 200 mg by mouth nightly. .   Yes Historical Provider, MD   pantoprazole (PROTONIX) 40 MG tablet Take 40 mg by mouth 2 times daily. Yes Historical Provider, MD   vitamin E 1000 UNIT capsule Take 400 Units by mouth daily. Yes Historical Provider, MD   Albuterol (VENTOLIN IN) Inhale into the lungs    Historical Provider, MD       Social History:   reports that she has been smoking. She has a 20.00 pack-year smoking history. She has never used smokeless tobacco. She reports that she drinks about 4.0 standard drinks of alcohol per week. She reports that she does not use drugs. Family History:  family history includes Heart Disease in her father; Kidney Disease in her father and mother. Reviewed.  Denies family history of sudden cardiac death, arrhythmia, premature CAD    Review of System:    · General ROS: negative for - chills, fever   · Psychological ROS: negative for - anxiety or depression  · Ophthalmic ROS: negative for - eye pain or loss of vision  · ENT ROS: negative for - epistaxis, headaches, nasal discharge, sore throat   · Allergy and Immunology ROS: negative for - hives, nasal congestion   · Hematological and Lymphatic ROS: negative for - bleeding problems, blood clots, bruising or jaundice  · Endocrine ROS: negative for - skin changes, temperature intolerance or unexpected weight changes  · Respiratory ROS: negative for - cough, hemoptysis, pleuritic pain, sputum changes or wheezing + chronic shortness of breath. · Cardiovascular ROS: Per HPI. · Gastrointestinal ROS: negative for - abdominal pain, blood in stools, diarrhea, hematemesis, melena, na usea/vomiting or  swallowing difficulty/pain +obese  · Genito-Urinary ROS: negative for - dysuria or incontinence  · Musculoskeletal ROS: negative for - joint swelling or muscle pain  · Neurological ROS: negative for - confusion, dizziness, gait disturbance, headaches, numbness/tingling,  seizures, speech problems, tremors, visual changes or weakness  · Dermatological ROS: negative for - rash    Physical Examination:  There were no vitals filed for this visit. · Constitutional: Oriented. No distress. · Head: Normocephalic and atraumatic. · Mouth/Throat: Oropharynx is clear and moist.   · Eyes: Conjunctivae normal. EOM are normal.   · Neck: Normal range of motion. Neck supple. No rigidity. No JVD present. · Cardiovascular: Irregular, S1&S2 and intact distal pulses. · Pulmonary/Chest: Bilateral respiratory sounds. No wheezes. No rhonchi. · Abdominal: Soft. Bowel sounds present. No distension, No tenderness. · Musculoskeletal: No tenderness. No edema    · Lymphadenopathy: Has no cervical adenopathy. · Neurological: Alert and oriented. Cranial nerve appears intact, No Gross deficit   · Skin: Skin is warm and dry. No rash noted. · Psychiatric: Has a normal mood, affect and behavior     Labs:  Reviewed. ECG: reviewed, 8/28/19 Atrial fibrillation v-rate 61 bpm.    Studies:   1. Event monitor: 4/2012 [facility])  NSR    2. 48 Holter Monitor: 10/2017 [facility])  Symptomatic Afib HR     3.  Echo: 1/9/19 ([facility])  LVEF 60-65%, hypertrophic CM  Severe concentric hypertrophy of LV  LA volume 43.2 ml/m, LA atrial flutter ablation. No medication to hold prior to the cardioversion.    -We educated the patient that left atrial flutter is a progressive disease, with more frequent episodes that will ensue. Subsequent episodes usually become more sustained to the extent that many individuals would then develop persistent left atrial flutter. Once persistence is reached, permanent left atrial flutter is inevitable. We discussed different management options for left atrial flutter including their risks and benefits. These options include use of cardioversion which provides an effective immediate therapy with success rates of 90% or higher, but it provides no short nor long term efficacy. Anti-arrhythmic medications has been very difficult to control left atrial flutter, both in regards to heart rate and rhythm control even with a powerful anti-arrhythmic medication (amiodarone). Left atrial flutter ablation is a potentially curative therapy with very reasonable success rate.     -The risks, benefits and alternatives of the left atrial flutter ablation procedure were discussed with the patient. The risks including, but not limited to, the risks of bleeding, infection, radiation exposure, injury to vascular, cardiac and surrounding structures (including pneumothorax), stroke, cardiac perforation, tamponade, need for emergent open heart surgery, need for pacemaker implantation, injury to the phrenic nerve, injury to the esophagus, myocardial infarction and death were discussed in detail. The patient opted to proceed with the left atrial flutter ablation. We will schedule for a radiofrequency ablation with [employer] Navigation system with a LEXI procedure immediately prior to this ablation. We will hold Xarelto for 12 hours prior to procedure. We will order BMP, CBC, PT/INR, and Type & Screen prior to the procedure. Follow up one to two weeks after cardioversion for EKG and 3 months after left atrial flutter.      Thank you for

## 2019-09-06 NOTE — PRE SEDATION
Sedation Pre-Procedure Note    Patient Name: Linda Doyle   YOB: 1955  Room/Bed: Room/bed info not found  Medical Record Number: [id number]  Date: 9/6/2019   Time: 9:57 AM       Indication:  Persistent atrial fibrillation    Consent: I have discussed with the patient and/or the patient representative the indication, alternatives, and the possible risks and/or complications of the planned procedure and the anesthesia methods. The patient and/or patient representative appear to understand and agree to proceed. Vital Signs: There were no vitals filed for this visit. Past Medical History:   has a past medical history of A-fib ([facility] [address] Cardiomyopathy ([facility] [address] Chest pain, COPD (chronic obstructive pulmonary disease) ([facility] [address] Hypertension, IHSS (idiopathic hypertrophic subaortic stenosis) ([facility] [address] Mitral insufficiency, VARUN (obstructive sleep apnea), Palpitations, and Tobacco abuse. Past Surgical History:   has a past surgical history that includes Cardiac catheterization; Hysterectomy; Hand surgery; Cardioversion; and Atrial ablation surgery. Medications:   Scheduled Meds:    sodium chloride flush  10 mL Intravenous 2 times per day     Continuous Infusions:    sodium chloride       PRN Meds: sodium chloride flush  Home Meds:   Prior to Admission medications    Medication Sig Start Date End Date Taking? Authorizing Provider   carvedilol (COREG) 25 MG tablet TAKE 1 TABLET BY MOUTH TWO TIMES DAILY WITH MEALS. 8/7/19  Yes Tita Mccabe MD   flecainide (TAMBOCOR) 50 MG tablet Take 1 tablet by mouth every 12 hours 5/22/19  Yes NIC Hurley - CNP   XARELTO 20 MG TABS tablet TAKE ONE TABLET, BY MOUTH, DAILY.  12/10/18  Yes Tita Mccabe MD   furosemide (LASIX) 20 MG tablet Take 1 tablet by mouth daily 11/9/18  Yes Tita Mccabe MD   losartan-hydrochlorothiazide (HYZAAR) 100-25 MG per tablet Take 1 tablet by mouth daily   Yes Historical Provider, MD   Budesonide-Formoterol Fumarate (SYMBICORT IN) Inhale into the lungs   Yes Historical Provider, MD   Doxylamine Succinate, Sleep, (SLEEP AID PO) Take 1 tablet by mouth nightly as needed    Yes Historical Provider, MD   rOPINIRole (REQUIP) 0.5 MG tablet Take 0.5 mg by mouth every evening   Yes Historical Provider, MD   clotrimazole-betamethasone (LOTRISONE) 1-0.05 % cream Apply topically 2 times daily Apply topically 2 times daily. Yes Historical Provider, MD   cyclobenzaprine (FLEXERIL) 10 MG tablet Take 10 mg by mouth nightly as needed for Muscle spasms   Yes Historical Provider, MD   fluticasone (FLONASE) 50 MCG/ACT nasal spray 1 spray by Nasal route daily   Yes Historical Provider, MD   cloNIDine (CATAPRES) 0.2 MG tablet Take 0.2 mg by mouth 2 times daily   Yes Historical Provider, MD   Omega-3 Fatty Acids (FISH OIL) 1000 MG CAPS Take 1,000 mg by mouth daily    Yes Historical Provider, MD   gabapentin (NEURONTIN) 100 MG capsule Take 200 mg by mouth nightly. .   Yes Historical Provider, MD   pantoprazole (PROTONIX) 40 MG tablet Take 40 mg by mouth 2 times daily. Yes Historical Provider, MD   vitamin E 1000 UNIT capsule Take 400 Units by mouth daily. Yes Historical Provider, MD   Albuterol (VENTOLIN IN) Inhale into the lungs    Historical Provider, MD     Coumadin Use Last 7 Days:  no  Antiplatelet drug therapy use last 7 days: no  Other anticoagulant use last 7 days: yes - Xarelto  Additional Medication Information:  n/a      Pre-Sedation Documentation and Exam:   I have personally completed a history, physical exam & review of systems for this patient (see notes).     Mallampati Airway Assessment:  Mallampati Class I - (soft palate, fauces, uvula & anterior/posterior tonsillar pillars are visible)    Prior History of Anesthesia Complications:   none    ASA Classification:  Class 2 - A normal healthy patient with mild systemic disease    Sedation/ Anesthesia Plan:   intravenous sedation    Medications Planned:   Methohexital    Patient is an

## 2019-09-11 ENCOUNTER — TELEPHONE (OUTPATIENT)
Dept: CARDIOLOGY CLINIC | Age: 64
End: 2019-09-11

## 2019-09-11 NOTE — TELEPHONE ENCOUNTER
Marisela Gaitan called in and she has an appointment with you on 9/19 s/p cardioversion on 9/6. She is wanting to know if she can go to [place] to get her EKG done or does she need to come to the appointment with you?  Thanks,

## 2019-09-11 NOTE — TELEPHONE ENCOUNTER
Patient called in asking to speak with April, she stated she mentioned to her that she can get an EKG done at [facility] instead of coming to [facility]  and she is interested in doing so. You can reach this patient at [phone].

## 2019-09-13 NOTE — TELEPHONE ENCOUNTER
Pt is calling back. Pt wants to know if she can go to [facility] to have her EKG, the hospital not Dr. Jeanine Jacobs office. You can reach the pt at [phone].

## 2019-09-19 DIAGNOSIS — I48.21 PERMANENT ATRIAL FIBRILLATION (HCC): Primary | ICD-10-CM

## 2019-09-27 ENCOUNTER — TELEPHONE (OUTPATIENT)
Dept: CARDIOLOGY CLINIC | Age: 64
End: 2019-09-27

## 2019-09-27 NOTE — TELEPHONE ENCOUNTER
Pt is calling for CC. Pt will need to hold Xarelto 3 days prior.       Last ov with Dr. Genna Hebert 8/28/19:  AFIB  HOCM: Echo: 1/9/19 [facility])  LVEF 60-65%, hypertrophic CM

## 2019-09-27 NOTE — TELEPHONE ENCOUNTER
Patient requesting to hold Xarelto for 3 day. She is having injections in her back. S/p atrial fib ablation 5/21/19.

## 2019-11-20 ENCOUNTER — HOSPITAL ENCOUNTER (OUTPATIENT)
Age: 64
Setting detail: OUTPATIENT SURGERY
Discharge: HOME | End: 2019-11-20
Attending: INTERNAL MEDICINE | Admitting: INTERNAL MEDICINE
Payer: COMMERCIAL

## 2019-11-20 VITALS
HEART RATE: 63 BPM | DIASTOLIC BLOOD PRESSURE: 77 MMHG | WEIGHT: 208 LBS | TEMPERATURE: 98 F | OXYGEN SATURATION: 98 % | RESPIRATION RATE: 14 BRPM | SYSTOLIC BLOOD PRESSURE: 163 MMHG | BODY MASS INDEX: 33.43 KG/M2 | HEIGHT: 66 IN

## 2019-11-20 PROCEDURE — 3609019000 HC EGD CAPSULE ENDOSCOPY: Performed by: INTERNAL MEDICINE

## 2019-11-20 PROCEDURE — 2720000010 HC SURG SUPPLY STERILE: Performed by: INTERNAL MEDICINE

## 2019-11-20 ASSESSMENT — PAIN - FUNCTIONAL ASSESSMENT: PAIN_FUNCTIONAL_ASSESSMENT: 0-10

## 2019-11-26 ENCOUNTER — OFFICE VISIT (OUTPATIENT)
Dept: CARDIOLOGY CLINIC | Age: 64
End: 2019-11-26
Payer: COMMERCIAL

## 2019-11-26 VITALS
BODY MASS INDEX: 34.04 KG/M2 | OXYGEN SATURATION: 88 % | SYSTOLIC BLOOD PRESSURE: 130 MMHG | WEIGHT: 211.8 LBS | HEART RATE: 59 BPM | DIASTOLIC BLOOD PRESSURE: 86 MMHG | HEIGHT: 66 IN

## 2019-11-26 DIAGNOSIS — I48.19 PERSISTENT ATRIAL FIBRILLATION (HCC): Primary | ICD-10-CM

## 2019-11-26 DIAGNOSIS — I50.32 CHRONIC DIASTOLIC CONGESTIVE HEART FAILURE (HCC): ICD-10-CM

## 2019-11-26 DIAGNOSIS — I42.1 HOCM (HYPERTROPHIC OBSTRUCTIVE CARDIOMYOPATHY) (HCC): ICD-10-CM

## 2019-11-26 DIAGNOSIS — E78.2 MIXED HYPERLIPIDEMIA: ICD-10-CM

## 2019-11-26 PROCEDURE — 99215 OFFICE O/P EST HI 40 MIN: CPT | Performed by: INTERNAL MEDICINE

## 2019-11-26 RX ORDER — METHOCARBAMOL 500 MG/1
500 TABLET, FILM COATED ORAL 4 TIMES DAILY
Status: ON HOLD | COMMUNITY
End: 2020-01-24 | Stop reason: ALTCHOICE

## 2019-11-26 RX ORDER — SPIRONOLACTONE 25 MG/1
25 TABLET ORAL DAILY
Status: ON HOLD | COMMUNITY
End: 2020-01-24 | Stop reason: SINTOL

## 2019-11-26 RX ORDER — POLYETHYLENE GLYCOL 3350 17 G/17G
17 POWDER, FOR SOLUTION ORAL DAILY PRN
COMMUNITY

## 2019-11-26 RX ORDER — FUROSEMIDE 40 MG/1
40 TABLET ORAL DAILY
Qty: 90 TABLET | Refills: 3 | Status: SHIPPED | OUTPATIENT
Start: 2019-11-26 | End: 2020-02-27 | Stop reason: SDUPTHER

## 2019-11-26 ASSESSMENT — ENCOUNTER SYMPTOMS
COUGH: 0
BLOOD IN STOOL: 0
EYE REDNESS: 0
WHEEZING: 0
SHORTNESS OF BREATH: 0
NAUSEA: 0

## 2019-11-27 ENCOUNTER — TELEPHONE (OUTPATIENT)
Dept: CARDIOLOGY CLINIC | Age: 64
End: 2019-11-27

## 2019-12-06 PROBLEM — N17.9 AKI (ACUTE KIDNEY INJURY) (HCC): Status: ACTIVE | Noted: 2019-12-06

## 2019-12-19 ENCOUNTER — OFFICE VISIT (OUTPATIENT)
Dept: CARDIOLOGY CLINIC | Age: 64
End: 2019-12-19
Payer: COMMERCIAL

## 2019-12-19 ENCOUNTER — TELEPHONE (OUTPATIENT)
Dept: CARDIOLOGY CLINIC | Age: 64
End: 2019-12-19

## 2019-12-19 VITALS
OXYGEN SATURATION: 95 % | HEART RATE: 54 BPM | WEIGHT: 203 LBS | HEIGHT: 66 IN | SYSTOLIC BLOOD PRESSURE: 132 MMHG | DIASTOLIC BLOOD PRESSURE: 78 MMHG | BODY MASS INDEX: 32.62 KG/M2

## 2019-12-19 DIAGNOSIS — R55 SYNCOPE, UNSPECIFIED SYNCOPE TYPE: Primary | ICD-10-CM

## 2019-12-19 PROCEDURE — 99205 OFFICE O/P NEW HI 60 MIN: CPT | Performed by: INTERNAL MEDICINE

## 2019-12-19 PROCEDURE — 93000 ELECTROCARDIOGRAM COMPLETE: CPT | Performed by: INTERNAL MEDICINE

## 2019-12-19 NOTE — PATIENT INSTRUCTIONS
[facility]THOPEDIC [facility] SP[facility] with Dr. Chantal Strange   The morning of your Procedure-LEXI (transesophageal echocardiogram) you will park in the hospital parking lot and report directly to the cath lab to check in. DATE: 1/21/20    TIME: 11:30 a.m. ARRIVAL TIME: 10:30 a.m. Pre-Procedure Instructions:  1. Do not eat or drink anything 8 hours before your procedure. 2. Hold Lasix morning of procedure. 3. Hold all diabetic medications the morning of the procedure. All other medications can be taken in the morning with sips of water. 4. Do not hold anticoagulation therapy: warfarin, eliquis, xarelto, plavix, aspirin therapy. 5. Do not use any lotions, creams or perfume the morning of procedure. 6. Please arrive 1 hour prior to procedure time. 7. Please have a responsible adult to drive you home after procedure. It is recommended you do not drive for 24 hours after procedure. 8. Cath lab will provide you with all post procedure instructions. If you have any questions regarding the procedure itself or medications please call [phone] and ask to speak with Nayely Ramirez RN. Patient Education        Continuous Heart Monitoring: About This Test  What is it? Continuous heart monitoring is a test that records the electrical activity of your heart. \"Continuous\" means the monitor is recording your heart's activity the whole time you're wearing the monitor. The test is done with a monitor that is an adhesive patch. Your doctor will place it on the skin of your chest. You may wear it for 2 weeks or longer. The test may be done to find out why you're having symptoms. Or it may be done to look for heartbeats that are too fast, too slow, or irregular. The monitor will give your doctor information similar to an electrocardiogram (EKG or ECG). An EKG translates the heart's electrical activity into line tracings on paper. These monitors are safe to use. No electricity is sent through your body.  There is no numbness from the throat spray wears off. Your throat may be sore for a few days after the test.  Follow-up care is a key part of your treatment and safety. Be sure to make and go to all appointments, and call your doctor if you are having problems. It's also a good idea to know your test results and keep a list of the medicines you take. What happens before the procedure?   Preparing for the procedure    · Understand exactly what procedure is planned, along with the risks, benefits, and other options. · Tell your doctors ALL the medicines, vitamins, supplements, and herbal remedies you take. Some of these can increase the risk of bleeding or interact with anesthesia.     · If you take blood thinners, such as warfarin (Coumadin), clopidogrel (Plavix), or aspirin, be sure to talk to your doctor. He or she will tell you if you should stop taking these medicines before your procedure. Make sure that you understand exactly what your doctor wants you to do.     · Your doctor will tell you which medicines to take or stop before your procedure. You may need to stop taking certain medicines a week or more before the procedure. So talk to your doctor as soon as you can.     · If you have an advance directive, let your doctor know. It may include a living will and a durable power of  for health care. Bring a copy to the hospital. If you don't have one, you may want to prepare one. It lets your doctor and loved ones know your health care wishes. Doctors advise that everyone prepare these papers before any type of surgery or procedure.     · Be sure to tell your doctor about any problems you have with your stomach or esophagus. Procedures can be stressful. This information will help you understand what you can expect. And it will help you safely prepare for your procedure. What happens on the day of the procedure? · Follow the instructions exactly about when to stop eating and drinking.  If you don't, your procedure may be canceled. If your doctor told you to take your medicines on the day of the procedure, take them with only a sip of water.     · Take a bath or shower before you come in for your procedure. Do not apply lotions, perfumes, deodorants, or nail polish.     · Take off all jewelry and piercings. And take out contact lenses, if you wear them.    At the hospital or surgery center   · Bring a picture ID.     · You will be kept comfortable and safe by your anesthesia provider. You may get medicine that relaxes you or puts you in a light sleep. The area being worked on will be numb.     · You will get some medicine sprayed in your throat. This will numb your throat to prevent you from gagging when the transducer is moved into your esophagus.     · You will lie on your left side on an exam table. You may get a mouth guard to protect your teeth.     · The procedure will take about 2 hours. During that time, the tube is in your throat for 10 to 20 minutes. Going home   · Be sure you have someone to drive you home. Anesthesia and pain medicine make it unsafe for you to drive.     · You will be given more specific instructions about recovering from your procedure. They will cover things like diet, wound care, follow-up care, driving, and getting back to your normal routine. When should you call your doctor? · You have questions or concerns.     · You don't understand how to prepare for your procedure.     · You become ill before the procedure (such as fever, flu, or a cold).     · You need to reschedule or have changed your mind about having the procedure. Where can you learn more? Go to https://[employer]Py[employer]id Screening Technology.Minitrade. org and sign in to your [employer] account. Enter K500 in the [facility] box to learn more about \"Transesophageal Echocardiogram: Before Your Procedure. \"     If you do not have an account, please click on the \"Sign Up Now\" link.   Current as of: July 22, 2018  Content Version: 12.1  © 7269-4897 Healthwise, Incorporated. Care instructions adapted under license by [facility] ([facility]). If you have questions about a medical condition or this instruction, always ask your healthcare professional. [address] any warranty or liability for your use of this information. Patient Education        Atrial Fibrillation: Care Instructions  Your Care Instructions    Atrial fibrillation is an irregular and often fast heartbeat. Treating this condition is important for several reasons. It can cause blood clots, which can travel from your heart to your brain and cause a stroke. If you have a fast heartbeat, you may feel lightheaded, dizzy, and weak. An irregular heartbeat can also increase your risk for heart failure. Atrial fibrillation is often the result of another heart condition, such as high blood pressure or coronary artery disease. Making changes to improve your heart condition will help you stay healthy and active. Follow-up care is a key part of your treatment and safety. Be sure to make and go to all appointments, and call your doctor if you are having problems. It's also a good idea to know your test results and keep a list of the medicines you take. How can you care for yourself at home? Medicines    · Take your medicines exactly as prescribed. Call your doctor if you think you are having a problem with your medicine. You will get more details on the specific medicines your doctor prescribes.     · If your doctor has given you a blood thinner to prevent a stroke, be sure you get instructions about how to take your medicine safely. Blood thinners can cause serious bleeding problems.     · Do not take any vitamins, over-the-counter drugs, or herbal products without talking to your doctor first.    Lifestyle changes    · Do not smoke. Smoking can increase your chance of a stroke and heart attack.  If you need help quitting, talk to your doctor about stop-smoking programs and medicines. These can increase your chances of quitting for good.     · Eat a heart-healthy diet.     · Stay at a healthy weight. Lose weight if you need to.     · Limit alcohol to 2 drinks a day for men and 1 drink a day for women. Too much alcohol can cause health problems.     · Avoid colds and flu. Get a pneumococcal vaccine shot. If you have had one before, ask your doctor whether you need another dose. Get a flu shot every year. If you must be around people with colds or flu, wash your hands often. Activity    · If your doctor recommends it, get more exercise. Walking is a good choice. Bit by bit, increase the amount you walk every day. Try for at least 30 minutes on most days of the week. You also may want to swim, bike, or do other activities. Your doctor may suggest that you join a cardiac rehabilitation program so that you can have help increasing your physical activity safely.     · Start light exercise if your doctor says it is okay. Even a small amount will help you get stronger, have more energy, and manage stress. Walking is an easy way to get exercise. Start out by walking a little more than you did in the hospital. Gradually increase the amount you walk.     · When you exercise, watch for signs that your heart is working too hard. You are pushing too hard if you cannot talk while you are exercising. If you become short of breath or dizzy or have chest pain, sit down and rest immediately.     · Check your pulse regularly. Place two fingers on the artery at the palm side of your wrist, in line with your thumb. If your heartbeat seems uneven or fast, talk to your doctor. When should you call for help? Call 911 anytime you think you may need emergency care. For example, call if:    · You have symptoms of a heart attack. These may include:  ? Chest pain or pressure, or a strange feeling in the chest.  ? Sweating. ? Shortness of breath. ? Nausea or vomiting.   ? Pain, pressure, or a strange feeling in the back, neck, jaw, or upper belly or in one or both shoulders or arms. ? Lightheadedness or sudden weakness. ? A fast or irregular heartbeat. After you call 911, the  may tell you to chew 1 adult-strength or 2 to 4 low-dose aspirin. Wait for an ambulance. Do not try to drive yourself.     · You have symptoms of a stroke. These may include:  ? Sudden numbness, tingling, weakness, or loss of movement in your face, arm, or leg, especially on only one side of your body. ? Sudden vision changes. ? Sudden trouble speaking. ? Sudden confusion or trouble understanding simple statements. ? Sudden problems with walking or balance. ? A sudden, severe headache that is different from past headaches.     · You passed out (lost consciousness).    Call your doctor now or seek immediate medical care if:    · You have new or increased shortness of breath.     · You feel dizzy or lightheaded, or you feel like you may faint.     · Your heart rate becomes irregular.     · You can feel your heart flutter in your chest or skip heartbeats. Tell your doctor if these symptoms are new or worse.    Watch closely for changes in your health, and be sure to contact your doctor if you have any problems. Where can you learn more? Go to https://SNAPP'.Spoken Communications. org and sign in to your "[employer]" account. Enter U020 in the [facility] box to learn more about \"Atrial Fibrillation: Care Instructions. \"     If you do not have an account, please click on the \"Sign Up Now\" link. Current as of: July 22, 2018  Content Version: 12.1  © 1263-2007 Healthwise, Incorporated. Care instructions adapted under license by [facility] ([facility]). If you have questions about a medical condition or this instruction, always ask your healthcare professional. [place] [address] any warranty or liability for your use of this information.          Patient Education        Learning About Your Stroke Risk When You Have medicines.     · Be active. If your doctor recommends it, get more exercise. Walking is a good choice. Bit by bit, increase the amount you walk every day. Try for at least 30 minutes on most days of the week. You also may want to swim, bike, or do other activities.     · Eat heart-healthy foods. These include fruits, vegetables, high-fiber foods, fish, and foods that are low in sodium, saturated fat, and trans fat.     · Stay at a healthy weight. Lose weight if you need to.     · Limit alcohol to 2 drinks a day for men and 1 drink a day for women.    Staying healthy    · Manage other health problems such as diabetes, high blood pressure, and high cholesterol.     · Get the flu vaccine every year. When should you call for help? Call 911 anytime you think you may need emergency care. For example, call if:    · You have new or worse symptoms of a stroke. These may include:  ? Sudden numbness, tingling, weakness, or loss of movement in your face, arm, or leg, especially on only one side of your body. ? Sudden vision changes. ? Sudden trouble speaking. ? Sudden confusion or trouble understanding simple statements. ? Sudden problems with walking or balance. ? A sudden, severe headache that is different from past headaches. Call 911 even if these symptoms go away in a few minutes.     · You feel like you are having another TIA.    Watch closely for changes in your health, and be sure to contact your doctor if you have any problems. Where can you learn more? Go to https://BodyMediaangel.Spex Group. org and sign in to your AppHero account. Enter [phone] in the [facility] box to learn more about \"Transient Ischemic Attack: Care Instructions. \"     If you do not have an account, please click on the \"Sign Up Now\" link. Current as of: September 26, 2018  Content Version: 12.1  © 3375-6116 Healthwise, Incorporated. Care instructions adapted under license by [facility] ([facility]).  If you have questions about a leakage, go to the bathroom at regular times, including when you first wake up and at bedtime. Also, limit fluids after dinner.     · If you are constipated, drink plenty of fluids, enough so that your urine is light yellow or clear like water. If you have kidney, heart, or liver disease and have to limit fluids, talk with your doctor before you increase the amount of fluids you drink. Set up a regular time for using the toilet. If you continue to have constipation, your doctor may suggest using a bulking agent, such as Metamucil, or a stool softener, laxative, or enema. Medicines    · Take your medicines exactly as prescribed. Call your doctor if you think you are having a problem with your medicine. You may be taking several medicines. ACE (angiotensin-converting enzyme) inhibitors, angiotensin II receptor blockers (ARBs), beta-blockers, diuretics (water pills), and calcium channel blockers control your blood pressure. Statins help lower cholesterol. Your doctor may also prescribe medicines for depression, pain, sleep problems, anxiety, or agitation.     · If your doctor has given you a blood thinner to prevent another stroke, be sure you get instructions about how to take your medicine safely. Blood thinners can cause serious bleeding problems.     · Do not take any over-the-counter medicines or herbal products without talking to your doctor first.     · If you take birth control pills or hormone therapy, talk to your doctor about whether they are right for you.    For family members and caregivers    · Make the home safe. Set up a room so that your loved one does not have to climb stairs. Be sure the bathroom is on the same floor. Move throw rugs and furniture that could cause falls. Make sure that the lighting is good. Put grab bars and seats in tubs and showers.     · Find out what your loved one can do and what he or she needs help with.  Try not to do things for your loved one that your loved one can do on instructions adapted under license by [facility] ([facility]). If you have questions about a medical condition or this instruction, always ask your healthcare professional. [address] any warranty or liability for your use of this information.

## 2019-12-19 NOTE — PROGRESS NOTES
[address]  Cardiology Consult    Rebecca Vivas  1955 December 19, 2019    Primary Cardiologist and Referring Physician[de-identified] Dr. Nicola Alberts        Reason for Referral: AFIB, GI bleed with Hgb in the 6, 3 units PRBC, syncope    CC: \"I need a heart procedure because I bleed and needed 3 pints of blood. \"       Subjective:     History of Present Illness:    Rebecca Vivas is a 59 y.o. patient with a PMH significant for IHSS, HOCM, diastolic dysfunciton, moderate MR, HTN, COPD, VARUN with CPAP, smoking addiction with persistent AFIB (2014) s/p successful DCCV 1/16/19, 2/8/19, AFIB Ablation 5/21/19. 8/28/19 AFlutter per Dr. Diego Cassidy progress note. Had reported at that time that she passed out early August 2019. Not on amiodarone secondary to lung disease. Schedule for repeat AFIB ablation 3/2020. She was hospitalized 11/6/19 [facility] for 4 syncopal episodes then presented to the ED with complaints of intermittent bleeding with bowel movements. Upon arrival her Hgb in the in the 6 range with 3 units of PRBC transfused. Dr. LEGGETT [facility] EGD/Colonoscopy no active bleed. 11/26/19 Dr. Cata Cerda endoscopy that was unremarkable. Today notes that she last passed out a few weeks ago. She feels that it is related to Flecainide. Last H/H 10.6/33.9 12/4/19. She has been on Xarelto for a few years. She remains off of Xarelto therapy. She has complaints of chronic VELASQUEZ with COPD, smoking addiction. Reports compliance with medication therapy. No further abnormal bruising or bleeding. No s/s of TIA/CVA. Patient denies any symptoms of chest pain, pressure, tightness, nausea, vomiting, near syncope, syncope, heart racing, palpitations, dizziness, lightheaded, PND, orthopnea, wheezing, diaphoresis, BLE edema, bilateral lower extremities pain, cramping or fatigue.        Past Medical History:   has a past medical history of A-fib ([address] SMITA (acute kidney injury) ([facility] [facility] Chest pain, COPD (chronic obstructive pulmonary disease) ([facility] Hyperlipidemia, Hypertension, IHSS (idiopathic hypertrophic subaortic stenosis) ([facility] Mitral insufficiency, VARUN (obstructive sleep apnea), Palpitations, and Tobacco abuse. Surgical History:   has a past surgical history that includes Cardiac catheterization; Hysterectomy; Hand surgery; Cardioversion; Atrial ablation surgery; and Endoscopy, small bowel with ileum (N/A, 11/20/2019). Social History:   reports that she has been smoking. She has a 20.00 pack-year smoking history. She has never used smokeless tobacco. She reports current alcohol use of about 4.0 standard drinks of alcohol per week. She reports that she does not use drugs. Family History:  family history includes Heart Disease in her father; Kidney Disease in her father and mother. Home Medications:  Were reviewed and are listed in nursing record and/or below  Prior to Admission medications    Medication Sig Start Date End Date Taking?  Authorizing Provider   methocarbamol (ROBAXIN) 500 MG tablet Take 500 mg by mouth 4 times daily   Yes Historical Provider, MD   spironolactone (ALDACTONE) 25 MG tablet Take 25 mg by mouth daily   Yes Historical Provider, MD   Cholecalciferol (VITAMIN D3) 125 MCG (5000 UT) TABS Take 1 tablet by mouth Daily   Yes Historical Provider, MD   polyethylene glycol (GLYCOLAX) packet Take 17 g by mouth daily as needed for Constipation   Yes Historical Provider, MD   furosemide (LASIX) 40 MG tablet Take 1 tablet by mouth daily 11/26/19  Yes Cele Beckham MD   flecainide (TAMBOCOR) 50 MG tablet Take 1 tablet by mouth 2 times daily 9/18/19  Yes NIC Hurley - CNP   carvedilol (COREG) 25 MG tablet TAKE 1 TABLET BY MOUTH TWO TIMES DAILY WITH MEALS. 8/7/19  Yes eCle Beckham MD   Albuterol (VENTOLIN IN) Inhale into the lungs   Yes Historical Provider, MD   Budesonide-Formoterol Fumarate (SYMBICORT IN) Inhale into the lungs   Yes Historical Provider, MD   Doxylamine Succinate, Sleep, (SLEEP AID PO) Take 1 tablet by mouth nightly as needed    Yes Historical Provider, MD   rOPINIRole (REQUIP) 0.5 MG tablet Take 0.5 mg by mouth every evening   Yes Historical Provider, MD   clotrimazole-betamethasone (LOTRISONE) 1-0.05 % cream Apply topically 2 times daily Apply topically 2 times daily. Yes Historical Provider, MD   cyclobenzaprine (FLEXERIL) 10 MG tablet Take 10 mg by mouth nightly as needed for Muscle spasms   Yes Historical Provider, MD   fluticasone (FLONASE) 50 MCG/ACT nasal spray 1 spray by Nasal route daily   Yes Historical Provider, MD   cloNIDine (CATAPRES) 0.2 MG tablet Take 0.2 mg by mouth 2 times daily   Yes Historical Provider, MD   gabapentin (NEURONTIN) 100 MG capsule Take 200 mg by mouth nightly. .   Yes Historical Provider, MD   pantoprazole (PROTONIX) 40 MG tablet Take 40 mg by mouth 2 times daily. Yes Historical Provider, MD        Allergies:  Percocet [oxycodone-acetaminophen]       Review of Systems: All reviewed and refer to HPI  · Constitutional: no unanticipated weight loss. There's been no change in energy level, sleep pattern, or activity level. No fevers, chills. · Eyes: No visual changes or diplopia. No scleral icterus. · ENT: No Headaches, hearing loss or vertigo. No mouth sores or sore throat. · Cardiovascular: No Chest pain, tightness or discomfort.  No Shortness of breath.   + Dyspnea on exertion, Orthopnea, Paroxysmal nocturnal dyspnea or breathlessness at rest.   No Palpitations.  No Syncope ('blackouts', 'faints', 'collapse') or dizziness. · Respiratory: No cough or wheezing, no sputum production. No hematemesis. · Gastrointestinal: No abdominal pain, appetite loss, blood in stools. No change in bowel or bladder habits. · Genitourinary: No dysuria, trouble voiding, or hematuria. · Musculoskeletal:  No gait disturbance, no joint complaints. · Integumentary: No rash or pruritis.   · Neurological: No headache, diplopia, change in muscle strength, Component Value Date     2019    K 3.9 2019    CL 98 2019    CO2 26 2019    BUN 27 2019    CREATININE 1.5 2019    CREATININE 1.1 2019    GFRAA 42 2019    LABGLOM 35 2019    GLUCOSE 70 2019    CALCIUM 9.5 2019     No results found for: PTINR  No results found for: LABA1C  No results found for: CKTOTAL, CKMB, CKMBINDEX, TROPONINI    Cardiac, Vascular and Imaging Data: All Personally Reviewed in Detail by Myself      EK19 SB     LEXI: 19  Summary   There is no evidence of mass or thrombus in the left atrium or appendage. Stress Test: 10/217 scanned in     Cath:  normal coronaries       Assessment and Plan     Primary Cardiologist and Referring Physician: Dr. Reyes Crowe   Referring Reason: AFIB, GI bleed with Hgb in the 6, 3 units PRBC, Referral for Consideration of Watchman LAAC. -Atrial Fibrillation,   --Hx of persistent AFIB () s/p successful DCCV 19, 19, AFIB Ablation 19. 19 AFlutter per  [facility] progress note. Had reported at that time that she passed out early 2019. Not on amiodarone secondary to lung disease. Schedule for repeat AFIB ablation 3/2020 with Dr. Seble Gotti    --She was hospitalized 19 [facility] for 4 syncopal episodes then presented to the ED with complaints of intermittent bleeding with bowel movements. Upon arrival her Hgb in the in the 6 range with 3 units of PRBC transfused. Dr. LEGGETT [facility] EGD/Colonoscopy no active bleed. -19 Dr. Jameson Hagan endoscopy that was unremarkable. She has been on Xarelto for a few years. She remains off of Xarelto therapy. --Today notes that she last passed out a few weeks ago. --She feels that it is related to Flecainide. --Last H/H 10.6/33.9 19. --No further abnormal bruising or bleeding. No s/s of TIA/CVA. --No s/s of TIA/CVA. --EKG today SB     CHADSvasc is at least 3 with a 3.2 % per year for sex, HTN, CHF.

## 2019-12-20 ENCOUNTER — TELEPHONE (OUTPATIENT)
Dept: CARDIOLOGY CLINIC | Age: 64
End: 2019-12-20

## 2019-12-20 RX ORDER — FLECAINIDE ACETATE 50 MG/1
TABLET ORAL
Qty: 60 TABLET | Refills: 0 | Status: SHIPPED | OUTPATIENT
Start: 2019-12-20 | End: 2020-01-13

## 2019-12-24 ENCOUNTER — TELEPHONE (OUTPATIENT)
Dept: CARDIOLOGY CLINIC | Age: 64
End: 2019-12-24

## 2020-01-13 RX ORDER — FLECAINIDE ACETATE 50 MG/1
TABLET ORAL
Qty: 60 TABLET | Refills: 5 | Status: SHIPPED | OUTPATIENT
Start: 2020-01-13 | End: 2020-01-14

## 2020-01-14 ENCOUNTER — TELEPHONE (OUTPATIENT)
Dept: CARDIOLOGY CLINIC | Age: 65
End: 2020-01-14

## 2020-01-14 RX ORDER — FLECAINIDE ACETATE 50 MG/1
TABLET ORAL
Qty: 60 TABLET | Refills: 0 | Status: SHIPPED | OUTPATIENT
Start: 2020-01-14 | End: 2020-01-16 | Stop reason: DRUGHIGH

## 2020-01-14 NOTE — TELEPHONE ENCOUNTER
Rec'd urgent monitor call from Kendall Jha [address] [facility]) [phone]. Georges said abnormal report showed patient fainted this morning around 10:15 AM.  Report faxed to our office. Gave to Pastor Xie. Angela called patient. Patient confirmed that she passed out for a couple of seconds but has no other symptoms. Daniel Laguna stated that patient declined medical attention.

## 2020-01-15 NOTE — TELEPHONE ENCOUNTER
Please call patient with instructions per Dr. Zayda Newby as I re discussed with him, to increase her  Flecainide from 50mg BID to 100mg BID and come in Friday for an EKG. Thanks.

## 2020-01-16 RX ORDER — FLECAINIDE ACETATE 100 MG/1
100 TABLET ORAL 2 TIMES DAILY
Status: ON HOLD | COMMUNITY
End: 2020-01-16 | Stop reason: SDUPTHER

## 2020-01-16 NOTE — TELEPHONE ENCOUNTER
Called and spoke with patient, relayed message below. Patient expressed understanding. Patient will come in tomorrow for an ekg. Please send in new script for flecainide.

## 2020-01-17 ENCOUNTER — NURSE ONLY (OUTPATIENT)
Dept: CARDIOLOGY CLINIC | Age: 65
End: 2020-01-17
Payer: COMMERCIAL

## 2020-01-17 ENCOUNTER — TELEPHONE (OUTPATIENT)
Dept: CARDIOLOGY CLINIC | Age: 65
End: 2020-01-17

## 2020-01-17 PROCEDURE — 93000 ELECTROCARDIOGRAM COMPLETE: CPT | Performed by: INTERNAL MEDICINE

## 2020-01-17 RX ORDER — FLECAINIDE ACETATE 100 MG/1
100 TABLET ORAL 2 TIMES DAILY
Qty: 60 TABLET | Refills: 5 | Status: SHIPPED | OUTPATIENT
Start: 2020-01-17 | End: 2020-01-24

## 2020-01-17 NOTE — TELEPHONE ENCOUNTER
If she's not on Xarelto or not on any [address], she will not be able to undergo neither the cardioversion nor the ablation. If she has GI bleeding from OACs, we will not be able to place her on [address].

## 2020-01-20 ENCOUNTER — TELEPHONE (OUTPATIENT)
Dept: CARDIOLOGY CLINIC | Age: 65
End: 2020-01-20

## 2020-01-20 NOTE — PROGRESS NOTES
Called patient about procedure in cath lab, instructed to arrive at  1030 for procedure at 1130. Patient should be NPO after midnight, but can take morning medication with sips of water. Instructed to have a responsible adult be with patient to take them home and stay with them afterwards. Patient asked to bring current list of medications. All questions and concerns addressed.

## 2020-01-20 NOTE — TELEPHONE ENCOUNTER
Called patient advised of Dr. Campbell Milder response as outlined below. Patient verbalized understanding. Emailed cath lab to update on cancellation of her LEXI CV scheduled for 1/24/2020 & Afib ablation scheduled for 3/24/2020.

## 2020-01-21 ENCOUNTER — HOSPITAL ENCOUNTER (OUTPATIENT)
Dept: CARDIAC CATH/INVASIVE PROCEDURES | Age: 65
Discharge: HOME | End: 2020-01-21
Attending: INTERNAL MEDICINE | Admitting: INTERNAL MEDICINE
Payer: COMMERCIAL

## 2020-01-21 VITALS
HEART RATE: 70 BPM | OXYGEN SATURATION: 97 % | WEIGHT: 208 LBS | DIASTOLIC BLOOD PRESSURE: 70 MMHG | BODY MASS INDEX: 33.43 KG/M2 | SYSTOLIC BLOOD PRESSURE: 133 MMHG | HEIGHT: 66 IN | RESPIRATION RATE: 16 BRPM

## 2020-01-21 PROCEDURE — 2580000003 HC RX 258

## 2020-01-21 PROCEDURE — 6360000002 HC RX W HCPCS

## 2020-01-21 PROCEDURE — 99153 MOD SED SAME PHYS/QHP EA: CPT

## 2020-01-21 PROCEDURE — 93312 ECHO TRANSESOPHAGEAL: CPT

## 2020-01-21 PROCEDURE — 93325 DOPPLER ECHO COLOR FLOW MAPG: CPT

## 2020-01-21 PROCEDURE — 99152 MOD SED SAME PHYS/QHP 5/>YRS: CPT

## 2020-01-21 RX ORDER — SODIUM CHLORIDE 0.9 % (FLUSH) 0.9 %
10 SYRINGE (ML) INJECTION EVERY 12 HOURS SCHEDULED
Status: DISCONTINUED | OUTPATIENT
Start: 2020-01-21 | End: 2020-01-22 | Stop reason: HOSPADM

## 2020-01-21 RX ORDER — SODIUM CHLORIDE 9 MG/ML
INJECTION, SOLUTION INTRAVENOUS CONTINUOUS
Status: DISCONTINUED | OUTPATIENT
Start: 2020-01-21 | End: 2020-01-22 | Stop reason: HOSPADM

## 2020-01-21 RX ORDER — SODIUM CHLORIDE 0.9 % (FLUSH) 0.9 %
10 SYRINGE (ML) INJECTION PRN
Status: DISCONTINUED | OUTPATIENT
Start: 2020-01-21 | End: 2020-01-22 | Stop reason: HOSPADM

## 2020-01-21 ASSESSMENT — PAIN SCALES - GENERAL: PAINLEVEL_OUTOF10: 0

## 2020-01-21 NOTE — TELEPHONE ENCOUNTER
Echocardiogram is okay  She says she has been passing out again  She says Dr. Bethany Torres has recently added or changed her arrhythmia medications he could not tell me which one was it on his wife can recall and find out which medications were changed recently. Patient states that she has been having a lot of dizziness after the change in medications.

## 2020-01-21 NOTE — TELEPHONE ENCOUNTER
Patient called back to inform that the medication that she believes is making her feel dizzy/lightheaded is Flecainide 50 mg BID. Says the day she went for ECHO, she passed out, hit the floor. Was out for a few seconds, BP was checked and was fine. Takes Flexeril 10 mg every night for muscle spasms, Requip 0.5 mg every evening, Gabapentin 200 mg nightly. She also takes Diphenhydramine 50 mg nightly. She is also taking Aldactone 25 mg daily, Lasix 40 mg daily, Carvedilol 25 mg BID and Clonidine 0.2 mg BID. Suggested patient reduce Lasix to 20 mg daily and reduce Aldactone to 12.5 mg daily. Also suggested she try cutting back on taking Diphenhydramine and Flexeril. Informed she should space medication 1-2 hours apart for a couple of weeks as well. Patient has a f/u with Dr. Osmin Samaniego on 1/24/20. Blossom Harper RN with plan.

## 2020-01-21 NOTE — H&P
History of Present Illness:    ASA 2  Mallampati 2     Verl Najjar is a 59 y.o. patient with a PMH significant for IHSS, HOCM, diastolic dysfunciton, moderate MR, HTN, COPD, VARUN with CPAP, smoking addiction with persistent AFIB (2014) s/p successful DCCV 1/16/19, 2/8/19, AFIB Ablation 5/21/19. 8/28/19 AFlutter per Dr. Kaylee Dowd progress note. Had reported at that time that she passed out early August 2019. Not on amiodarone secondary to lung disease. Schedule for repeat AFIB ablation 3/2020.      She was hospitalized 11/6/19 [facility] for 4 syncopal episodes then presented to the ED with complaints of intermittent bleeding with bowel movements. Upon arrival her Hgb in the in the 6 range with 3 units of PRBC transfused. Dr. LEGGETT [facility] EGD/Colonoscopy no active bleed. 11/26/19 Dr. Kylie Arnold endoscopy that was unremarkable. Today notes that she last passed out a few weeks ago. She feels that it is related to Flecainide. Last H/H 10.6/33.9 12/4/19. She has been on Xarelto for a few years. She remains off of Xarelto therapy. She has complaints of chronic VELASQUEZ with COPD, smoking addiction. Reports compliance with medication therapy. No further abnormal bruising or bleeding. No s/s of TIA/CVA. Patient denies any symptoms of chest pain, pressure, tightness, nausea, vomiting, near syncope, syncope, heart racing, palpitations, dizziness, lightheaded, PND, orthopnea, wheezing, diaphoresis, BLE edema, bilateral lower extremities pain, cramping or fatigue.         Past Medical History:   has a past medical history of A-fib ([address] SMITA (acute kidney injury) ([address] Cardiomyopathy ([address] Chest pain, COPD (chronic obstructive pulmonary disease) ([address] Hyperlipidemia, Hypertension, IHSS (idiopathic hypertrophic subaortic stenosis) ([address] Mitral insufficiency, VARUN (obstructive sleep apnea), Palpitations, and Tobacco abuse.     Surgical History:   has a past surgical history that includes Cardiac catheterization; Hysterectomy;  Hand times daily Apply topically 2 times daily.     Yes Historical Provider, MD   cyclobenzaprine (FLEXERIL) 10 MG tablet Take 10 mg by mouth nightly as needed for Muscle spasms     Yes Historical Provider, MD   fluticasone (FLONASE) 50 MCG/ACT nasal spray 1 spray by Nasal route daily     Yes Historical Provider, MD   cloNIDine (CATAPRES) 0.2 MG tablet Take 0.2 mg by mouth 2 times daily     Yes Historical Provider, MD   gabapentin (NEURONTIN) 100 MG capsule Take 200 mg by mouth nightly. .     Yes Historical Provider, MD   pantoprazole (PROTONIX) 40 MG tablet Take 40 mg by mouth 2 times daily.     Yes Historical Provider, MD            Allergies:  Percocet [oxycodone-acetaminophen]         Review of Systems: All reviewed and refer to HPI  · Constitutional: no unanticipated weight loss. There's been no change in energy level, sleep pattern, or activity level. No fevers, chills. · Eyes: No visual changes or diplopia. No scleral icterus. · ENT: No Headaches, hearing loss or vertigo. No mouth sores or sore throat. · Cardiovascular: No Chest pain, tightness or discomfort. · No Shortness of breath.   + Dyspnea on exertion, Orthopnea, Paroxysmal nocturnal dyspnea or breathlessness at rest.  · No Palpitations. · No Syncope ('blackouts', 'faints', 'collapse') or dizziness. · Respiratory: No cough or wheezing, no sputum production. No hematemesis. · Gastrointestinal: No abdominal pain, appetite loss, blood in stools. No change in bowel or bladder habits. · Genitourinary: No dysuria, trouble voiding, or hematuria. · Musculoskeletal:  No gait disturbance, no joint complaints. · Integumentary: No rash or pruritis. · Neurological: No headache, diplopia, change in muscle strength, numbness or tingling. · Psychiatric: No anxiety or depression. · Endocrine: No temperature intolerance. No excessive thirst, fluid intake, or urination. No tremor.   · Hematologic/Lymphatic: No abnormal bruising or bleeding, blood clots or 2019     LABGLOM 35 2019     GLUCOSE 70 2019     CALCIUM 9.5 2019      No results found for: PTINR  No results found for: LABA1C  No results found for: CKTOTAL, CKMB, CKMBINDEX, TROPONINI     Cardiac, Vascular and Imaging Data: All Personally Reviewed in Detail by Myself       EK19 SB      LEXI: 19  Summary   There is no evidence of mass or thrombus in the left atrium or appendage.     Stress Test: 10/217 scanned in      Cath:  normal coronaries         Assessment and Plan      Primary Cardiologist and Referring Physician: Dr. Nya Kaufman   Referring Reason: AFIB, GI bleed with Hgb in the 6, 3 units PRBC, Referral for Consideration of Watchman LAAC.    -Atrial Fibrillation,   --Hx of persistent AFIB () s/p successful DCCV 19, 19, AFIB Ablation 19. 19 AFlutter per Dr. Radha Seth progress note. Had reported at that time that she passed out early 2019. Not on amiodarone secondary to lung disease. Schedule for repeat AFIB ablation 3/2020 with Dr. Johnny Rice     --She was hospitalized 19 [facility] for 4 syncopal episodes then presented to the ED with complaints of intermittent bleeding with bowel movements. Upon arrival her Hgb in the in the 6 range with 3 units of PRBC transfused. Dr. LEGGETT [facility] EGD/Colonoscopy no active bleed. -19 Dr. Shelli Roman endoscopy that was unremarkable. She has been on Xarelto for a few years. She remains off of Xarelto therapy.     --Today notes that she last passed out a few weeks ago. --She feels that it is related to Flecainide. --Last H/H 10.6/33.9 19. --No further abnormal bruising or bleeding. No s/s of TIA/CVA. --No s/s of TIA/CVA.   --EKG today SB      CHADSvasc is at least 3 with a 3.2 % per year for sex, HTN, CHF.      HASbled is at least 2 with a 4.1 % per year bleeding risk for Prior major bleeding or predisposition to bleeding, Medication usage predisposing to bleeding.        Discussed Watchman

## 2020-01-24 ENCOUNTER — TELEPHONE (OUTPATIENT)
Dept: CARDIOLOGY CLINIC | Age: 65
End: 2020-01-24

## 2020-01-24 ENCOUNTER — OFFICE VISIT (OUTPATIENT)
Dept: CARDIOLOGY CLINIC | Age: 65
End: 2020-01-24
Payer: COMMERCIAL

## 2020-01-24 VITALS
WEIGHT: 203 LBS | BODY MASS INDEX: 32.62 KG/M2 | HEIGHT: 66 IN | HEART RATE: 80 BPM | OXYGEN SATURATION: 95 % | DIASTOLIC BLOOD PRESSURE: 62 MMHG | SYSTOLIC BLOOD PRESSURE: 94 MMHG

## 2020-01-24 PROCEDURE — 99214 OFFICE O/P EST MOD 30 MIN: CPT | Performed by: INTERNAL MEDICINE

## 2020-01-24 PROCEDURE — 93000 ELECTROCARDIOGRAM COMPLETE: CPT | Performed by: INTERNAL MEDICINE

## 2020-01-24 RX ORDER — FLECAINIDE ACETATE 50 MG/1
50 TABLET ORAL 2 TIMES DAILY
Qty: 60 TABLET | Refills: 6
Start: 2020-01-24 | End: 2020-11-02 | Stop reason: SDUPTHER

## 2020-01-24 NOTE — LETTER
pre-certification. If for some reason, your procedure is still pending the day before your scheduled procedure, it must be moved or delayed until we have authorization to proceed. If you have Medicare, no pre-cert is required. Co-Payment  If you have a copay or a deposit due that is your responsibility to pay at the time of service, the Cardiac Cath lab will accept your payment on the day of your procedure. Please bring with you a form of payment. Any questions regarding your copay, please contact your insurance company. Please bring your photo ID, insurance cards and copayment if you have one. (Cash, checks & credit cards are accepted)    On the day of your procedure     Please report directly to  The [address] [place] [address] Str[address] [facility] [address]. You should park in the lot directly in front of the hospital, you are able to [facility] for free from (6AM-4:30PM). As you approach the hospital you will notice that there are two entrances: The first entrance is the MAIN entrance that will be on your LEFT that has the Revolving Door, the second entrance is the [facility] entrance that will be on your RIGHT. We prefer that you take the Bridge as it's closer to your destination. Once you enter the Hospital, turn to your RIGHT and walk directly to the Cath lab which will be at the very end. I will be in contact with you the week before your procedure. You may reach me at   [phone] in the meantime.     Sincerely,    Loida Morrow RN,  Watchman Coordinator

## 2020-01-24 NOTE — PROGRESS NOTES
father; Kidney Disease in her father and mother. Home Medications:  Were reviewed and are listed in nursing record and/or below  Prior to Admission medications    Medication Sig Start Date End Date Taking? Authorizing Provider   flecainide (TAMBOCOR) 100 MG tablet Take 1 tablet by mouth 2 times daily 1/17/20   Keya Skelton MD   methocarbamol (ROBAXIN) 500 MG tablet Take 500 mg by mouth 4 times daily    Historical Provider, MD   spironolactone (ALDACTONE) 25 MG tablet Take 25 mg by mouth daily    Historical Provider, MD   Cholecalciferol (VITAMIN D3) 125 MCG (5000 UT) TABS Take 1 tablet by mouth Daily    Historical Provider, MD   polyethylene glycol (GLYCOLAX) packet Take 17 g by mouth daily as needed for Constipation    Historical Provider, MD   furosemide (LASIX) 40 MG tablet Take 1 tablet by mouth daily 11/26/19   Zackary Renee MD   carvedilol (COREG) 25 MG tablet TAKE 1 TABLET BY MOUTH TWO TIMES DAILY WITH MEALS. 8/7/19   Zackary Renee MD   Albuterol (VENTOLIN IN) Inhale into the lungs    Historical Provider, MD   Budesonide-Formoterol Fumarate (SYMBICORT IN) Inhale into the lungs    Historical Provider, MD   Doxylamine Succinate, Sleep, (SLEEP AID PO) Take 1 tablet by mouth nightly as needed     Historical Provider, MD   rOPINIRole (REQUIP) 0.5 MG tablet Take 0.5 mg by mouth every evening    Historical Provider, MD   clotrimazole-betamethasone (LOTRISONE) 1-0.05 % cream Apply topically 2 times daily Apply topically 2 times daily. Historical Provider, MD   cyclobenzaprine (FLEXERIL) 10 MG tablet Take 10 mg by mouth nightly as needed for Muscle spasms    Historical Provider, MD   fluticasone (FLONASE) 50 MCG/ACT nasal spray 1 spray by Nasal route daily    Historical Provider, MD   cloNIDine (CATAPRES) 0.2 MG tablet Take 0.2 mg by mouth 2 times daily    Historical Provider, MD   gabapentin (NEURONTIN) 100 MG capsule Take 200 mg by mouth nightly. Wade Noble     Historical Provider, MD   pantoprazole (PROTONIX) 40 MG MAURO DIMENSIONS:    45 Degree 21 mmx 22 mm   130 degree 22 mm x 23 mm    30 day Event monitor: pending     Assessment and Plan     Primary Cardiologist and Referring Physician: Dr. Teodoro Gordon   Referring Reason: AFIB, GI bleed     -Atrial Fibrillation,   --Hx of persistent AFIB    CHADSvasc is at least 3 with a 3.2 % per year for sex, HTN, CHF. HASbled is at least 2 with a 4.1 % per year bleeding risk for Prior major bleeding or predisposition to bleeding, Medication usage predisposing to bleeding. LEXI completed 1/21/20  Dr. Teodoro Gordon Shared Decision Making     Syncope  --We will maintain her on Flecainide 50 mg BID   --Will discontinue Spironolactone now  --Continue Lasix 20 mg daily, may take an additional 20 mg PRN  --Encouraged to wear compression stockings daily with chronic venous insufficiency   -Will re-challenge on Eliquis 5 mg BID, asked her to call in a few weeks with an update  Plan for Watchman LAAC procedure March/April     I had a detail discussion with the patient and family regarding the risk and benefit of the procedures. I explained to them the details of the procedure. I explained to them that the procedure will be performed under general anesthesia. I explained any risk of bleeding, pericardial effusion and tamponade, perforation of the vessel, stroke, myocardial infarction or death. All questions and concerns answered at this time. Confirmed per patient before leaving the office. Diastolic dysfunction  Moderate MR  HTN  BP controlled today. SBP < 100 asymptomatic   No s/s of CHF on clinical examine  BLE edema with chronic venous insufficiency, encouraged compression stockings     She has complaints of chronic VELASQUEZ with COPD, VARUN with sleep apnea and smoking addiction. We will call her to discuss procedure date    Thank you for allowing us to participate in the care of Abbie Dong. Please do not hesitate to contact me if you have any questions.     Parth Simmons MD, MPH    [facility] [address] [facility] Janak Coello  Ph: [phone]  Fax: [phone]    1/24/2020 8:55 AM    This note was scribed in the presence of Dr. Bren Billy MD by Scott Britton RN. Physician Attestation:  The scribes documentation has been prepared under my direction and personally reviewed by me in its entirety. I confirm that the note above accurately reflects all work, treatment, procedures, and medical decision making performed by me.

## 2020-01-24 NOTE — LETTER
[address] [facility] ([facility])      Dear Ajith Moore,      Your Watchman procedure has been scheduled for Monday 3/2//2020 at [facility] with Dr. Main Mendez MD.  Please arrive directly to the Cath Lab at 7 am.    You will need to make arrangements to have a responsible adult drive you home after your procedure and someone needs to stay with you for 24 hours. Procedure labs will need to be completed at [facility] only on either Monday, Tuesday or Wednesday the week before your procedure. Please check in with Registration in the main lobby. Pre-Procedure Instructions:  ? You will need to FAST for at least 8 hours prior to your procedure, nothing to eat or drink after midnight.  ? NO caffeine the morning of your procedure. ? You need to wash your body with a soap called HIBICLENS the evening before or the morning of your scheduled procedure from the neck down. A prescription has been sent to your pharmacy. If your insurance does not cover the soap, you can pick it up over the counter, ask your pharmacy for help. ? Take your Eliquis on 3/1/20 AM dose then hold PM dose and 3/2/2020 dose. ? Hold Lasix the day of the procedure 3/2/2020  ? Hold all over the counter vitamins and supplements the morning of your procedure. ? Hold all diabetic medications including, Metformin. If you take Lantus/Levemir you should only take ½ your normal dose the evening before. ? You need to take 325mg aspirin the morning of your procedure. If you are currently taking 81mg please take 4 tablets that morning. ? ALL other medications can be taken in the morning with sips of water  ? Do not use any lotions, creams or perfume the morning of procedure. ? You will be staying overnight at [facility] after your procedure so please pack an overnight bag if needed.                                   Pre-certification Our office will be in contact with your insurance company regarding pre-certification. If for some reason, your procedure is still pending the day before your scheduled procedure, it must be moved or delayed until we have authorization to proceed. If you have Medicare, no pre-cert is required. Co-Payment  If you have a copay or a deposit due that is your responsibility to pay at the time of service, the Cardiac Cath lab will accept your payment on the day of your procedure. Please bring with you a form of payment. Any questions regarding your copay, please contact your insurance company. Please bring your photo ID, insurance cards and copayment if you have one. (Cash, checks & credit cards are accepted)    On the day of your procedure     Please report directly to  The [address] [place] [address] Str[address] [facility] [address]. You should park in the lot directly in front of the hospital, you are able to [facility] for free from (6AM-4:30PM). As you approach the hospital you will notice that there are two entrances: The first entrance is the MAIN entrance that will be on your LEFT that has the Revolving Door, the second entrance is the [facility] entrance that will be on your RIGHT. We prefer that you take the Bridge as it's closer to your destination. Once you enter the Hospital, turn to your RIGHT and walk directly to the Cath lab which will be at the very end. I will be in contact with you the week before your procedure. You may reach me at   [phone] in the meantime.     Sincerely,    Keshawn Mccloud RN,  Watchman Coordinator

## 2020-01-24 NOTE — TELEPHONE ENCOUNTER
Dr. Dionne Ponce, RN Abdirizak Reese, RN    Dr. Sushant Petersen saw patient in follow up today 1/24/20. Dr. Darrell Daniels, if you could please complete shared decision making documentation here per Dr. Sushant Petersen and for All Islas RN to see. All Islas, once this is completed, she has completed the Watchman workup and Dr. Sushant Petersen discussed tentative scheduling March/April 2020. Updated working list.     Any questions, let me know. Thanks all.

## 2020-01-24 NOTE — PATIENT INSTRUCTIONS
Patient Education        apixaban  Pronunciation:  vito PIX a ban  Brand:  Eliquis  What is the most important information I should know about apixaban? Apixaban increases your risk of severe or fatal bleeding, especially if you take certain medicines at the same time (including some over-the-counter medicines). It is very important to tell your doctor about all medicines you have recently used. Call your doctor at once if you have signs of bleeding such as: swelling, pain, feeling very weak or dizzy, bleeding gums, nosebleeds, heavy menstrual periods or abnormal vaginal bleeding, blood in your urine, bloody or tarry stools, coughing up blood or vomit that looks like coffee grounds, or any bleeding that will not stop. Apixaban can cause a very serious blood clot around your spinal cord if you undergo a spinal tap or receive spinal anesthesia (epidural), especially if you have a genetic spinal defect, if you have a spinal catheter in place, if you have a history of spinal surgery or repeated spinal taps, or if you are also using other drugs that can affect blood clotting. This type of blood clot can lead to long-term or permanent paralysis. Get emergency medical help if you have symptoms of a spinal cord blood clot such as back pain, numbness or muscle weakness in your lower body, or loss of bladder or bowel control. Do not stop taking apixaban unless your doctor tells you to. Stopping suddenly can increase your risk of blood clot or stroke. What is apixaban? Apixaban is used to lower the risk of stroke caused by a blood clot in people with a heart rhythm disorder called atrial fibrillation. Apixaban is also used after hip or knee replacement surgery to prevent a type of blood clot called deep vein thrombosis (DVT), which can lead to blood clots in the lungs (pulmonary embolism). Apixaban is also used to treat DVT or pulmonary embolism (PE), and to lower your risk of having a repeat DVT or PE.   Apixaban may also be used for purposes not listed in this medication guide. What should I discuss with my healthcare provider before taking apixaban? You should not take apixaban if you are allergic to it, or if you have active bleeding from a surgery, injury, or other cause. Apixaban may cause you to bleed more easily, especially if you have a bleeding disorder that is inherited or caused by disease. Tell your doctor if you have an artificial heart valve, or if you have ever had:  · liver or kidney disease;  · if you are older than 80; or  · if you weigh less than 132 pounds (60 kilograms). Apixaban can cause a very serious blood clot around your spinal cord if you undergo a spinal tap or receive spinal anesthesia (epidural). This type of blood clot could cause long-term paralysis, and may be more likely to occur if:   · you have a spinal catheter in place or if a catheter has been recently removed;  · you have a history of spinal surgery or repeated spinal taps;  · you have recently had a spinal tap or epidural anesthesia;  · you are taking an NSAID (nonsteroidal anti-inflammatory drug)--aspirin, ibuprofen (Advil, Motrin), naproxen (Aleve), diclofenac, indomethacin, meloxicam, and others; or  · you are using other medicines to treat or prevent blood clots. Taking apixaban may increase the risk of bleeding while you are pregnant or during your delivery. Tell your doctor if you are pregnant or plan to become pregnant. You should not breast-feed while using this medicine. How should I take apixaban? Follow all directions on your prescription label and read all medication guides or instruction sheets. Your doctor may occasionally change your dose. Use the medicine exactly as directed. You may take apixaban with or without food. If you cannot swallow a tablet whole, crush and mix it with water, apple juice, or a spoonful of applesauce. Swallow the mixture right away without chewing. Do not save it for later use.   A

## 2020-02-14 PROCEDURE — 93228 REMOTE 30 DAY ECG REV/REPORT: CPT | Performed by: INTERNAL MEDICINE

## 2020-02-19 NOTE — TELEPHONE ENCOUNTER
Spoke with Kandy and she is there now getting a Medial Branch Block-spine. She does not know if she will need another one and will let Chase Sullivan RN know when he calls to schedule her Watchman LAAC procedure. She is ready to go for Watchman. I told her that she will tentatively be in the next 1-2 months. She confirmed understanding and does not have any further q/c at this time.

## 2020-02-20 ENCOUNTER — TELEPHONE (OUTPATIENT)
Dept: CARDIOLOGY CLINIC | Age: 65
End: 2020-02-20

## 2020-02-20 RX ORDER — CHLORHEXIDINE GLUCONATE 4 G/100ML
SOLUTION TOPICAL
Qty: 1 BOTTLE | Refills: 0 | Status: ON HOLD | OUTPATIENT
Start: 2020-02-20 | End: 2020-03-03 | Stop reason: HOSPADM

## 2020-02-20 NOTE — TELEPHONE ENCOUNTER
Patient informed me she has been taking Eliquis 1 tab daily. I informed her the order was to be on twice a day. Patient check instructions and states will start taking 2 times a day.

## 2020-02-20 NOTE — TELEPHONE ENCOUNTER
Spoke with patient. Patient is agreeable to Watchman on 3/2/2020 to arrive at 7 am.  Patient o get blood work including a U/A on 2/21/2020.

## 2020-02-21 ENCOUNTER — HOSPITAL ENCOUNTER (OUTPATIENT)
Age: 65
Discharge: HOME | End: 2020-02-21
Payer: COMMERCIAL

## 2020-02-21 LAB
ABO/RH: NORMAL
ANION GAP SERPL CALCULATED.3IONS-SCNC: 13 MMOL/L (ref 3–16)
ANTIBODY SCREEN: NORMAL
BACTERIA: ABNORMAL /HPF
BILIRUBIN URINE: NEGATIVE
BLOOD, URINE: NEGATIVE
BUN BLDV-MCNC: 13 MG/DL (ref 7–20)
CALCIUM SERPL-MCNC: 9 MG/DL (ref 8.3–10.6)
CHLORIDE BLD-SCNC: 95 MMOL/L (ref 99–110)
CLARITY: ABNORMAL
CO2: 22 MMOL/L (ref 21–32)
COLOR: YELLOW
COMMENT UA: ABNORMAL
CREAT SERPL-MCNC: 1.4 MG/DL (ref 0.6–1.2)
EPITHELIAL CELLS, UA: 0 /HPF (ref 0–5)
GFR AFRICAN AMERICAN: 46
GFR NON-AFRICAN AMERICAN: 38
GLUCOSE BLD-MCNC: 142 MG/DL (ref 70–99)
GLUCOSE URINE: NEGATIVE MG/DL
HCT VFR BLD CALC: 31.6 % (ref 36–48)
HEMOGLOBIN: 10.2 G/DL (ref 12–16)
HYALINE CASTS: 1 /LPF (ref 0–8)
KETONES, URINE: NEGATIVE MG/DL
LEUKOCYTE ESTERASE, URINE: ABNORMAL
MCH RBC QN AUTO: 25.4 PG (ref 26–34)
MCHC RBC AUTO-ENTMCNC: 32.3 G/DL (ref 31–36)
MCV RBC AUTO: 78.5 FL (ref 80–100)
MICROSCOPIC EXAMINATION: YES
NITRITE, URINE: POSITIVE
PDW BLD-RTO: 18.4 % (ref 12.4–15.4)
PH UA: 7 (ref 5–8)
PLATELET # BLD: 170 K/UL (ref 135–450)
PMV BLD AUTO: 7.2 FL (ref 5–10.5)
POTASSIUM SERPL-SCNC: 4.2 MMOL/L (ref 3.5–5.1)
PROTEIN UA: NEGATIVE MG/DL
RBC # BLD: 4.02 M/UL (ref 4–5.2)
RBC UA: 1 /HPF (ref 0–4)
SODIUM BLD-SCNC: 130 MMOL/L (ref 136–145)
SPECIFIC GRAVITY UA: 1.01 (ref 1–1.03)
URINE TYPE: ABNORMAL
UROBILINOGEN, URINE: 1 E.U./DL
WBC # BLD: 4.2 K/UL (ref 4–11)
WBC UA: 303 /HPF (ref 0–5)

## 2020-02-21 PROCEDURE — 86850 RBC ANTIBODY SCREEN: CPT

## 2020-02-21 PROCEDURE — 36415 COLL VENOUS BLD VENIPUNCTURE: CPT

## 2020-02-21 PROCEDURE — 80048 BASIC METABOLIC PNL TOTAL CA: CPT

## 2020-02-21 PROCEDURE — 86901 BLOOD TYPING SEROLOGIC RH(D): CPT

## 2020-02-21 PROCEDURE — 85027 COMPLETE CBC AUTOMATED: CPT

## 2020-02-21 PROCEDURE — 86900 BLOOD TYPING SEROLOGIC ABO: CPT

## 2020-02-21 PROCEDURE — 81001 URINALYSIS AUTO W/SCOPE: CPT

## 2020-02-25 RX ORDER — CIPROFLOXACIN 500 MG/1
500 TABLET, FILM COATED ORAL 2 TIMES DAILY
Qty: 20 TABLET | Refills: 0 | Status: SHIPPED | OUTPATIENT
Start: 2020-02-25 | End: 2020-03-06

## 2020-02-26 NOTE — PROGRESS NOTES
[address]  Office Visit    Ivett Ruedaclair  1955 February 27, 2020    CC:   Chief Complaint   Patient presents with    Atrial Fibrillation     Patient states she has syncope/SOB/Dizziness     HPI:  The patient is 59 y.o. female with a past medical history significant for HOCM, IHSS, diastolic dysfunction, moderate MR, HTN, COPD, VARUN with use of CPAP, persistent atrial fib and GI bleed (with resultant syncope; transfused with 3u PRBC's) and smoking addiction who is here for follow up prior to possible Watchman LAAC device placement on 3/2/2020. She is S/P DCCV in 1/2019 and 2/2019 and S/P atrial fib ablation in 5/2019 and atrial flutter ablation in 8/2019 (Dr. Silver Nyhan). She was scheduled for repeat atrial fib ablation in 3/2020, however cancelled d/t her inability to take anticoagulation d/t GIB. In her preop evaluation she was noted to have UTI and placed on Cipro. Urine culture pending. Overall not feeling well. Has had episodes of dizziness and had episode of syncope ~ 3 weeks ago. Syncopal episodes have occurred when she gets up and starts walking (last one occurred during the night when she got up to use the bathroom). Has had LOC for few seconds; no c/o incontinence. Feeling weak overall. Currently on Cipro: has had urinary pain/pressure and frequency/urgency and seems to be gradually improving. Feels cold \"all the time. \"  Home wt: 200-203#. Has noted some edema in her ankles. Has had episodes of palpitations (\"heart races at times\"). Home BP's: 82//72 with HR 50-70's. Denies chest pain/discomfort, orthopnea/PND, cough, weight change or claudication. Labs from 2/21/2020: Hgb 10.2 (hgb 10.6 on labs from 12/2019). Has been back on Eliquis x 2 weeks. Review of Systems:  Constitutional: Denies night sweats or fever.  + fatigue/weakness  HEENT: Denies new visual changes, ringing in ears, nosebleeds nasal congestion  Respiratory: Denies new or change in SOB, PND, orthopnea 25 mg by mouth nightly as needed for Itching      cloNIDine (CATAPRES) 0.2 MG tablet Take 1 tablet by mouth nightly 60 tablet 0    ciprofloxacin (CIPRO) 500 MG tablet Take 1 tablet by mouth 2 times daily for 10 days 20 tablet 0    chlorhexidine (HIBICLENS) 4 % external liquid Wash body from neck down the night before or morning of procedure 1 Bottle 0    flecainide (TAMBOCOR) 50 MG tablet Take 1 tablet by mouth 2 times daily 60 tablet 6    apixaban (ELIQUIS) 5 MG TABS tablet Take 1 tablet by mouth 2 times daily 60 tablet 5    Cholecalciferol (VITAMIN D3) 125 MCG (5000 UT) TABS Take 1 tablet by mouth Daily      polyethylene glycol (GLYCOLAX) packet Take 17 g by mouth daily as needed for Constipation      furosemide (LASIX) 40 MG tablet Take 1 tablet by mouth daily 90 tablet 3    carvedilol (COREG) 25 MG tablet TAKE 1 TABLET BY MOUTH TWO TIMES DAILY WITH MEALS. 180 tablet 3    Albuterol (VENTOLIN IN) Inhale into the lungs      Budesonide-Formoterol Fumarate (SYMBICORT IN) Inhale into the lungs      rOPINIRole (REQUIP) 0.5 MG tablet Take 0.5 mg by mouth every evening      clotrimazole-betamethasone (LOTRISONE) 1-0.05 % cream Apply topically 2 times daily Apply topically 2 times daily.  cyclobenzaprine (FLEXERIL) 10 MG tablet Take 10 mg by mouth nightly as needed for Muscle spasms      fluticasone (FLONASE) 50 MCG/ACT nasal spray 1 spray by Nasal route daily      gabapentin (NEURONTIN) 100 MG capsule Take 200 mg by mouth nightly. Diania Medico pantoprazole (PROTONIX) 40 MG tablet Take 40 mg by mouth 2 times daily. No current facility-administered medications for this visit. Physical Exam:   BP (!) 86/54 Comment: standing  Pulse 94   Ht 5' 6\" (1.676 m)   Wt 210 lb (95.3 kg)   SpO2 98%   BMI 33.89 kg/m²   Wt Readings from Last 2 Encounters:   02/27/20 210 lb (95.3 kg)   01/24/20 203 lb (92.1 kg)     Constitutional: She is oriented to person, place, and time.  She appears well-developed and well-nourished. In no acute distress. HEENT: Normocephalic and atraumatic. Sclerae anicteric. No xanthelasmas. EOM's intact. Neck: Supple. No JVD present. Carotids without bruits. No thyromegaly present. No lymphadenopathy present. Cardiovascular: RRR, normal S1 and S2; II/VI systolic murmur  Pulmonary/Chest: Effort normal.  Lungs clear to auscultation. Chest wall nontender  Abdominal: soft, nontender, nondistended. + bowel sounds; no hepatomegaly   Extremities: No cyanosis, or clubbing. Pulses are 2+ radial/carotid/DP/PT bilaterally. Cap refill brisk. Trace-1+ bilateral lower leg edema. Bilateral TEDS hose in place. Cap refill brisk  Neurological: No focal deficit. Skin: Skin is warm and dry. Psychiatric: She has a normal mood and affect. Her speech is normal and behavior is normal.     Lab Review:   No results found for: TRIG, HDL, LDLCALC, LDLDIRECT, LABVLDL  Lab Results   Component Value Date     02/21/2020    K 4.2 02/21/2020    CL 95 02/21/2020    CO2 22 02/21/2020    BUN 13 02/21/2020    CREATININE 1.4 02/21/2020    GLUCOSE 142 02/21/2020    CALCIUM 9.0 02/21/2020      Lab Results   Component Value Date    WBC 4.2 02/21/2020    HGB 10.2 (L) 02/21/2020    HCT 31.6 (L) 02/21/2020    MCV 78.5 (L) 02/21/2020     02/21/2020       ECG 2/27/2020:  SR vs atrial flutter with controlled VR; nonspecific TW changes    LEXI pre Watchman 1/21/20   Summary   LVEF is preserved   Mild to moderate MR    MARUO DIMENSIONS:    45 Degree 21 mmx 22 mm   130 degree 22 mm x 23 mm    Event monitor 1/2020: SR with episodes of atrial fib  A-fib burden 14%    Echo 1/14/2020:  HCM, LVEF 55-60%, L atrium severely dilated, mod MR, mod pulmonary HTN    10/2017 Lexiscan-Myoview:  Negative for ischemia    6/2006 Angiogram:  Patent coronary arteries      Assessment:    1. Syncope and collapse  -recurrent episodes  -hypotensive on exam with some suggestion of postural hypotension  -will adjust medications    2.  Paroxysmal A-fib ([facility] [address]  -recent event monitor showed 14% AF burden  -remains on Flecainide and carvedilol  -currently on Eliquis (Hgb stable)  -scheduled for Watchman LAAC device on 3/2/2020 d/t GIB requiring transfusion  -CHADS VASC score 3;  HAS BLED 2    3. HOCM (hypertrophic obstructive cardiomyopathy) ([facility] [address]  -? Contributing to her syncope  -no gradient mentioned on last echo  -chronic diastolic dysfunction  -continue BB (consider changing to Toprol-will defer to Dr. Rock William)    4. Nonrheumatic mitral valve regurgitation  -moderate  -undergoing evaluation for possible Mitraclip at [facility]    5. Essential hypertension  -hypotensive today with evidence of postural hypotension  -will decrease clonidine    6. Urinary tract infection without hematuria, site unspecified  -now on Cipro  -urine culture today    7. Chronic venous insufficiency  -chronic edema in lower legs  -using ANGELA hose  -decrease sodium intake    Plan:  Send for urine culture today  Decrease Clonidine to 0.2 mg at night   Continue Eliquis, carvedilol, flecainide, lasix  Continue Cipro for now  Discussed low fat/low sodium diet and reinforced regular aerobic exercise. Labs from 2/21/2020 reviewed with pt  Follow up post Watchman    Return for Post Watchman. Thanks for allowing me to participate in the care of this patient.       Itz Espinoza, APRN-CNP  [address], [address]) [place], [facility]  Office: [phone]  Fax: [phone]

## 2020-02-27 ENCOUNTER — OFFICE VISIT (OUTPATIENT)
Dept: CARDIOLOGY CLINIC | Age: 65
End: 2020-02-27
Payer: COMMERCIAL

## 2020-02-27 VITALS
SYSTOLIC BLOOD PRESSURE: 86 MMHG | HEART RATE: 94 BPM | OXYGEN SATURATION: 98 % | HEIGHT: 66 IN | WEIGHT: 210 LBS | DIASTOLIC BLOOD PRESSURE: 54 MMHG | BODY MASS INDEX: 33.75 KG/M2

## 2020-02-27 DIAGNOSIS — N39.0 ACUTE UTI: ICD-10-CM

## 2020-02-27 LAB
BILIRUBIN URINE: NEGATIVE
BLOOD, URINE: NEGATIVE
CLARITY: CLEAR
COLOR: YELLOW
GLUCOSE URINE: NEGATIVE MG/DL
KETONES, URINE: NEGATIVE MG/DL
LEUKOCYTE ESTERASE, URINE: NEGATIVE
MICROSCOPIC EXAMINATION: NORMAL
NITRITE, URINE: NEGATIVE
PH UA: 7 (ref 5–8)
PROTEIN UA: NEGATIVE MG/DL
SPECIFIC GRAVITY UA: 1.01 (ref 1–1.03)
URINE TYPE: NORMAL
UROBILINOGEN, URINE: 1 E.U./DL

## 2020-02-27 PROCEDURE — 99214 OFFICE O/P EST MOD 30 MIN: CPT | Performed by: NURSE PRACTITIONER

## 2020-02-27 PROCEDURE — 93000 ELECTROCARDIOGRAM COMPLETE: CPT | Performed by: NURSE PRACTITIONER

## 2020-02-27 RX ORDER — DIPHENHYDRAMINE HCL 25 MG
25 CAPSULE ORAL NIGHTLY PRN
COMMUNITY
End: 2021-02-18

## 2020-02-27 RX ORDER — CLONIDINE HYDROCHLORIDE 0.2 MG/1
0.2 TABLET ORAL NIGHTLY
Qty: 60 TABLET | Refills: 0
Start: 2020-02-27 | End: 2020-03-17

## 2020-02-27 RX ORDER — FUROSEMIDE 40 MG/1
20 TABLET ORAL DAILY
Qty: 90 TABLET | Refills: 0
Start: 2020-02-27 | End: 2020-03-17 | Stop reason: SDUPTHER

## 2020-02-28 ENCOUNTER — TELEPHONE (OUTPATIENT)
Dept: CARDIOLOGY CLINIC | Age: 65
End: 2020-02-28

## 2020-02-28 NOTE — TELEPHONE ENCOUNTER
Spoke with patient about time for procedure on Monday 3/2/2020.  Instructed patient to arrive to hospital at 7 AM. Patient states understanding.  Answered any further questions as well. Patients  Leobardo Schirmer to bring patient to hospital for procedure.

## 2020-03-02 ENCOUNTER — ANESTHESIA (OUTPATIENT)
Dept: CARDIAC CATH/INVASIVE PROCEDURES | Age: 65
End: 2020-03-02

## 2020-03-02 ENCOUNTER — ANESTHESIA EVENT (OUTPATIENT)
Dept: CARDIAC CATH/INVASIVE PROCEDURES | Age: 65
End: 2020-03-02

## 2020-03-02 ENCOUNTER — HOSPITAL ENCOUNTER (INPATIENT)
Dept: CARDIAC CATH/INVASIVE PROCEDURES | Age: 65
LOS: 1 days | Discharge: HOME | DRG: 274 | End: 2020-03-03
Attending: INTERNAL MEDICINE | Admitting: INTERNAL MEDICINE
Payer: COMMERCIAL

## 2020-03-02 VITALS
OXYGEN SATURATION: 93 % | RESPIRATION RATE: 1 BRPM | SYSTOLIC BLOOD PRESSURE: 75 MMHG | TEMPERATURE: 95.7 F | DIASTOLIC BLOOD PRESSURE: 50 MMHG

## 2020-03-02 PROBLEM — Z95.818 PRESENCE OF WATCHMAN LEFT ATRIAL APPENDAGE CLOSURE DEVICE: Status: ACTIVE | Noted: 2020-03-02

## 2020-03-02 LAB
ABO/RH: NORMAL
ANTIBODY SCREEN: NORMAL
CHOLESTEROL, TOTAL: 130 MG/DL (ref 0–199)
EKG ATRIAL RATE: 227 BPM
EKG DIAGNOSIS: NORMAL
EKG DIAGNOSIS: NORMAL
EKG Q-T INTERVAL: 484 MS
EKG Q-T INTERVAL: 506 MS
EKG QRS DURATION: 118 MS
EKG QRS DURATION: 130 MS
EKG QTC CALCULATION (BAZETT): 530 MS
EKG QTC CALCULATION (BAZETT): 533 MS
EKG R AXIS: 22 DEGREES
EKG R AXIS: 6 DEGREES
EKG T AXIS: 196 DEGREES
EKG T AXIS: 198 DEGREES
EKG VENTRICULAR RATE: 66 BPM
EKG VENTRICULAR RATE: 73 BPM
HDLC SERPL-MCNC: 32 MG/DL (ref 40–60)
LDL CHOLESTEROL CALCULATED: 88 MG/DL
POC ACT LR: 149 SEC
POC ACT LR: 248 SEC
POC ACT LR: 316 SEC
POC ACT LR: >400 SEC
TRIGL SERPL-MCNC: 52 MG/DL (ref 0–150)
VLDLC SERPL CALC-MCNC: 10 MG/DL

## 2020-03-02 PROCEDURE — 85347 COAGULATION TIME ACTIVATED: CPT

## 2020-03-02 PROCEDURE — 02L73DK OCCLUSION OF LEFT ATRIAL APPENDAGE WITH INTRALUMINAL DEVICE, PERCUTANEOUS APPROACH: ICD-10-PCS | Performed by: INTERNAL MEDICINE

## 2020-03-02 PROCEDURE — 6360000002 HC RX W HCPCS: Performed by: NURSE ANESTHETIST, CERTIFIED REGISTERED

## 2020-03-02 PROCEDURE — 86850 RBC ANTIBODY SCREEN: CPT

## 2020-03-02 PROCEDURE — 7100000001 HC PACU RECOVERY - ADDTL 15 MIN

## 2020-03-02 PROCEDURE — C1769 GUIDE WIRE: HCPCS

## 2020-03-02 PROCEDURE — 6360000002 HC RX W HCPCS: Performed by: INTERNAL MEDICINE

## 2020-03-02 PROCEDURE — 94640 AIRWAY INHALATION TREATMENT: CPT

## 2020-03-02 PROCEDURE — 2580000003 HC RX 258: Performed by: NURSE ANESTHETIST, CERTIFIED REGISTERED

## 2020-03-02 PROCEDURE — C1894 INTRO/SHEATH, NON-LASER: HCPCS

## 2020-03-02 PROCEDURE — 93005 ELECTROCARDIOGRAM TRACING: CPT | Performed by: INTERNAL MEDICINE

## 2020-03-02 PROCEDURE — 2780000010 HC IMPLANT OTHER

## 2020-03-02 PROCEDURE — 33340 PERQ CLSR TCAT L ATR APNDGE: CPT

## 2020-03-02 PROCEDURE — 33340 PERQ CLSR TCAT L ATR APNDGE: CPT | Performed by: INTERNAL MEDICINE

## 2020-03-02 PROCEDURE — 2500000003 HC RX 250 WO HCPCS: Performed by: NURSE ANESTHETIST, CERTIFIED REGISTERED

## 2020-03-02 PROCEDURE — 80061 LIPID PANEL: CPT

## 2020-03-02 PROCEDURE — 6360000002 HC RX W HCPCS

## 2020-03-02 PROCEDURE — 3700000001 HC ADD 15 MINUTES (ANESTHESIA)

## 2020-03-02 PROCEDURE — 6370000000 HC RX 637 (ALT 250 FOR IP): Performed by: INTERNAL MEDICINE

## 2020-03-02 PROCEDURE — 2709999900 HC NON-CHARGEABLE SUPPLY

## 2020-03-02 PROCEDURE — 37799 UNLISTED PX VASCULAR SURGERY: CPT

## 2020-03-02 PROCEDURE — 2580000003 HC RX 258: Performed by: INTERNAL MEDICINE

## 2020-03-02 PROCEDURE — C1893 INTRO/SHEATH, FIXED,NON-PEEL: HCPCS

## 2020-03-02 PROCEDURE — 93010 ELECTROCARDIOGRAM REPORT: CPT | Performed by: INTERNAL MEDICINE

## 2020-03-02 PROCEDURE — 51702 INSERT TEMP BLADDER CATH: CPT

## 2020-03-02 PROCEDURE — 2700000000 HC OXYGEN THERAPY PER DAY

## 2020-03-02 PROCEDURE — 86901 BLOOD TYPING SEROLOGIC RH(D): CPT

## 2020-03-02 PROCEDURE — 7100000000 HC PACU RECOVERY - FIRST 15 MIN

## 2020-03-02 PROCEDURE — 2500000003 HC RX 250 WO HCPCS

## 2020-03-02 PROCEDURE — 36620 INSERTION CATHETER ARTERY: CPT

## 2020-03-02 PROCEDURE — 6360000004 HC RX CONTRAST MEDICATION: Performed by: INTERNAL MEDICINE

## 2020-03-02 PROCEDURE — 93355 ECHO TRANSESOPHAGEAL (TEE): CPT

## 2020-03-02 PROCEDURE — 2580000003 HC RX 258

## 2020-03-02 PROCEDURE — 86900 BLOOD TYPING SEROLOGIC ABO: CPT

## 2020-03-02 PROCEDURE — 3700000000 HC ANESTHESIA ATTENDED CARE

## 2020-03-02 PROCEDURE — 94761 N-INVAS EAR/PLS OXIMETRY MLT: CPT

## 2020-03-02 PROCEDURE — 2100000000 HC CCU R&B

## 2020-03-02 RX ORDER — CARVEDILOL 25 MG/1
25 TABLET ORAL 2 TIMES DAILY WITH MEALS
Status: DISCONTINUED | OUTPATIENT
Start: 2020-03-02 | End: 2020-03-03 | Stop reason: HOSPADM

## 2020-03-02 RX ORDER — SODIUM CHLORIDE 9 MG/ML
INJECTION, SOLUTION INTRAVENOUS CONTINUOUS PRN
Status: DISCONTINUED | OUTPATIENT
Start: 2020-03-02 | End: 2020-03-02 | Stop reason: SDUPTHER

## 2020-03-02 RX ORDER — GLYCOPYRROLATE 0.2 MG/ML
INJECTION INTRAMUSCULAR; INTRAVENOUS PRN
Status: DISCONTINUED | OUTPATIENT
Start: 2020-03-02 | End: 2020-03-02 | Stop reason: SDUPTHER

## 2020-03-02 RX ORDER — ROPINIROLE 1 MG/1
0.5 TABLET, FILM COATED ORAL NIGHTLY
Status: DISCONTINUED | OUTPATIENT
Start: 2020-03-02 | End: 2020-03-03 | Stop reason: HOSPADM

## 2020-03-02 RX ORDER — LABETALOL HYDROCHLORIDE 5 MG/ML
5 INJECTION, SOLUTION INTRAVENOUS EVERY 10 MIN PRN
Status: DISCONTINUED | OUTPATIENT
Start: 2020-03-02 | End: 2020-03-02

## 2020-03-02 RX ORDER — ONDANSETRON 2 MG/ML
4 INJECTION INTRAMUSCULAR; INTRAVENOUS
Status: DISCONTINUED | OUTPATIENT
Start: 2020-03-02 | End: 2020-03-02

## 2020-03-02 RX ORDER — DIPHENHYDRAMINE HCL 25 MG
50 TABLET ORAL NIGHTLY PRN
Status: DISCONTINUED | OUTPATIENT
Start: 2020-03-03 | End: 2020-03-02

## 2020-03-02 RX ORDER — SUCCINYLCHOLINE/SOD CL,ISO/PF 200MG/10ML
SYRINGE (ML) INTRAVENOUS PRN
Status: DISCONTINUED | OUTPATIENT
Start: 2020-03-02 | End: 2020-03-02 | Stop reason: SDUPTHER

## 2020-03-02 RX ORDER — SODIUM CHLORIDE 9 MG/ML
INJECTION, SOLUTION INTRAVENOUS CONTINUOUS
Status: DISCONTINUED | OUTPATIENT
Start: 2020-03-02 | End: 2020-03-02

## 2020-03-02 RX ORDER — FENTANYL CITRATE 50 UG/ML
INJECTION, SOLUTION INTRAMUSCULAR; INTRAVENOUS PRN
Status: DISCONTINUED | OUTPATIENT
Start: 2020-03-02 | End: 2020-03-02 | Stop reason: SDUPTHER

## 2020-03-02 RX ORDER — DEXAMETHASONE SODIUM PHOSPHATE 4 MG/ML
INJECTION, SOLUTION INTRA-ARTICULAR; INTRALESIONAL; INTRAMUSCULAR; INTRAVENOUS; SOFT TISSUE PRN
Status: DISCONTINUED | OUTPATIENT
Start: 2020-03-02 | End: 2020-03-02 | Stop reason: SDUPTHER

## 2020-03-02 RX ORDER — FUROSEMIDE 10 MG/ML
20 INJECTION INTRAMUSCULAR; INTRAVENOUS ONCE
Status: COMPLETED | OUTPATIENT
Start: 2020-03-02 | End: 2020-03-02

## 2020-03-02 RX ORDER — ONDANSETRON 2 MG/ML
4 INJECTION INTRAMUSCULAR; INTRAVENOUS EVERY 6 HOURS PRN
Status: DISCONTINUED | OUTPATIENT
Start: 2020-03-02 | End: 2020-03-03 | Stop reason: HOSPADM

## 2020-03-02 RX ORDER — SODIUM CHLORIDE 0.9 % (FLUSH) 0.9 %
10 SYRINGE (ML) INJECTION EVERY 12 HOURS SCHEDULED
Status: DISCONTINUED | OUTPATIENT
Start: 2020-03-02 | End: 2020-03-03 | Stop reason: HOSPADM

## 2020-03-02 RX ORDER — FLUTICASONE PROPIONATE 50 MCG
1 SPRAY, SUSPENSION (ML) NASAL DAILY
Status: DISCONTINUED | OUTPATIENT
Start: 2020-03-03 | End: 2020-03-03 | Stop reason: HOSPADM

## 2020-03-02 RX ORDER — PROTAMINE SULFATE 10 MG/ML
INJECTION, SOLUTION INTRAVENOUS PRN
Status: DISCONTINUED | OUTPATIENT
Start: 2020-03-02 | End: 2020-03-02 | Stop reason: SDUPTHER

## 2020-03-02 RX ORDER — CLONIDINE HYDROCHLORIDE 0.1 MG/1
0.2 TABLET ORAL NIGHTLY
Status: DISCONTINUED | OUTPATIENT
Start: 2020-03-02 | End: 2020-03-03 | Stop reason: HOSPADM

## 2020-03-02 RX ORDER — ASPIRIN 81 MG/1
81 TABLET, CHEWABLE ORAL DAILY
Status: DISCONTINUED | OUTPATIENT
Start: 2020-03-03 | End: 2020-03-03 | Stop reason: HOSPADM

## 2020-03-02 RX ORDER — HYDRALAZINE HYDROCHLORIDE 20 MG/ML
5 INJECTION INTRAMUSCULAR; INTRAVENOUS
Status: DISCONTINUED | OUTPATIENT
Start: 2020-03-02 | End: 2020-03-02

## 2020-03-02 RX ORDER — SODIUM CHLORIDE 0.9 % (FLUSH) 0.9 %
10 SYRINGE (ML) INJECTION PRN
Status: DISCONTINUED | OUTPATIENT
Start: 2020-03-02 | End: 2020-03-02 | Stop reason: SDUPTHER

## 2020-03-02 RX ORDER — FLECAINIDE ACETATE 100 MG/1
50 TABLET ORAL 2 TIMES DAILY
Status: DISCONTINUED | OUTPATIENT
Start: 2020-03-02 | End: 2020-03-03 | Stop reason: HOSPADM

## 2020-03-02 RX ORDER — CIPROFLOXACIN 500 MG/1
500 TABLET, FILM COATED ORAL 2 TIMES DAILY
Status: DISCONTINUED | OUTPATIENT
Start: 2020-03-02 | End: 2020-03-03 | Stop reason: HOSPADM

## 2020-03-02 RX ORDER — DIPHENHYDRAMINE HCL 25 MG
50 TABLET ORAL NIGHTLY PRN
Status: DISCONTINUED | OUTPATIENT
Start: 2020-03-02 | End: 2020-03-03 | Stop reason: HOSPADM

## 2020-03-02 RX ORDER — MIDAZOLAM HYDROCHLORIDE 1 MG/ML
INJECTION INTRAMUSCULAR; INTRAVENOUS PRN
Status: DISCONTINUED | OUTPATIENT
Start: 2020-03-02 | End: 2020-03-02 | Stop reason: SDUPTHER

## 2020-03-02 RX ORDER — ACETAMINOPHEN 325 MG/1
650 TABLET ORAL EVERY 4 HOURS PRN
Status: DISCONTINUED | OUTPATIENT
Start: 2020-03-02 | End: 2020-03-03 | Stop reason: HOSPADM

## 2020-03-02 RX ORDER — MORPHINE SULFATE 2 MG/ML
2 INJECTION, SOLUTION INTRAMUSCULAR; INTRAVENOUS EVERY 5 MIN PRN
Status: DISCONTINUED | OUTPATIENT
Start: 2020-03-02 | End: 2020-03-02

## 2020-03-02 RX ORDER — PHENYLEPHRINE HCL IN 0.9% NACL 1 MG/10 ML
SYRINGE (ML) INTRAVENOUS PRN
Status: DISCONTINUED | OUTPATIENT
Start: 2020-03-02 | End: 2020-03-02 | Stop reason: SDUPTHER

## 2020-03-02 RX ORDER — ATROPINE SULFATE 0.1 MG/ML
INJECTION INTRAVENOUS
Status: DISCONTINUED
Start: 2020-03-02 | End: 2020-03-02 | Stop reason: WASHOUT

## 2020-03-02 RX ORDER — SODIUM CHLORIDE 9 MG/ML
INJECTION INTRAVENOUS PRN
Status: DISCONTINUED | OUTPATIENT
Start: 2020-03-02 | End: 2020-03-02 | Stop reason: SDUPTHER

## 2020-03-02 RX ORDER — BUDESONIDE AND FORMOTEROL FUMARATE DIHYDRATE 80; 4.5 UG/1; UG/1
1 AEROSOL RESPIRATORY (INHALATION) 2 TIMES DAILY
Status: DISCONTINUED | OUTPATIENT
Start: 2020-03-02 | End: 2020-03-03 | Stop reason: HOSPADM

## 2020-03-02 RX ORDER — SODIUM CHLORIDE 0.9 % (FLUSH) 0.9 %
10 SYRINGE (ML) INJECTION PRN
Status: DISCONTINUED | OUTPATIENT
Start: 2020-03-02 | End: 2020-03-03 | Stop reason: HOSPADM

## 2020-03-02 RX ORDER — VECURONIUM BROMIDE 1 MG/ML
INJECTION, POWDER, LYOPHILIZED, FOR SOLUTION INTRAVENOUS PRN
Status: DISCONTINUED | OUTPATIENT
Start: 2020-03-02 | End: 2020-03-02 | Stop reason: SDUPTHER

## 2020-03-02 RX ORDER — PROPOFOL 10 MG/ML
INJECTION, EMULSION INTRAVENOUS PRN
Status: DISCONTINUED | OUTPATIENT
Start: 2020-03-02 | End: 2020-03-02 | Stop reason: SDUPTHER

## 2020-03-02 RX ORDER — GABAPENTIN 300 MG/1
300 CAPSULE ORAL NIGHTLY
Status: DISCONTINUED | OUTPATIENT
Start: 2020-03-02 | End: 2020-03-03 | Stop reason: HOSPADM

## 2020-03-02 RX ORDER — ONDANSETRON 2 MG/ML
INJECTION INTRAMUSCULAR; INTRAVENOUS PRN
Status: DISCONTINUED | OUTPATIENT
Start: 2020-03-02 | End: 2020-03-02 | Stop reason: ALTCHOICE

## 2020-03-02 RX ORDER — MORPHINE SULFATE 2 MG/ML
1 INJECTION, SOLUTION INTRAMUSCULAR; INTRAVENOUS EVERY 5 MIN PRN
Status: DISCONTINUED | OUTPATIENT
Start: 2020-03-02 | End: 2020-03-02

## 2020-03-02 RX ORDER — PANTOPRAZOLE SODIUM 40 MG/1
40 TABLET, DELAYED RELEASE ORAL 2 TIMES DAILY
Status: DISCONTINUED | OUTPATIENT
Start: 2020-03-02 | End: 2020-03-03 | Stop reason: HOSPADM

## 2020-03-02 RX ORDER — MEPERIDINE HYDROCHLORIDE 25 MG/ML
12.5 INJECTION INTRAMUSCULAR; INTRAVENOUS; SUBCUTANEOUS EVERY 5 MIN PRN
Status: DISCONTINUED | OUTPATIENT
Start: 2020-03-02 | End: 2020-03-02

## 2020-03-02 RX ORDER — HEPARIN SODIUM 1000 [USP'U]/ML
INJECTION, SOLUTION INTRAVENOUS; SUBCUTANEOUS PRN
Status: DISCONTINUED | OUTPATIENT
Start: 2020-03-02 | End: 2020-03-02 | Stop reason: SDUPTHER

## 2020-03-02 RX ORDER — LIDOCAINE HYDROCHLORIDE 20 MG/ML
INJECTION, SOLUTION EPIDURAL; INFILTRATION; INTRACAUDAL; PERINEURAL PRN
Status: DISCONTINUED | OUTPATIENT
Start: 2020-03-02 | End: 2020-03-02 | Stop reason: SDUPTHER

## 2020-03-02 RX ADMIN — CIPROFLOXACIN HYDROCHLORIDE 500 MG: 500 TABLET, FILM COATED ORAL at 21:01

## 2020-03-02 RX ADMIN — PROPOFOL 100 MG: 10 INJECTION, EMULSION INTRAVENOUS at 09:20

## 2020-03-02 RX ADMIN — MIDAZOLAM 2 MG: 1 INJECTION INTRAMUSCULAR; INTRAVENOUS at 09:14

## 2020-03-02 RX ADMIN — GLYCOPYRROLATE 0.2 MG: 0.2 INJECTION, SOLUTION INTRAMUSCULAR; INTRAVENOUS at 09:25

## 2020-03-02 RX ADMIN — ONDANSETRON 4 MG: 2 INJECTION INTRAMUSCULAR; INTRAVENOUS at 09:25

## 2020-03-02 RX ADMIN — SODIUM CHLORIDE 5 ML: 9 INJECTION INTRAMUSCULAR; INTRAVENOUS; SUBCUTANEOUS at 09:23

## 2020-03-02 RX ADMIN — Medication 120 MG: at 09:20

## 2020-03-02 RX ADMIN — CEFAZOLIN 2 G: 1 INJECTION, POWDER, FOR SOLUTION INTRAMUSCULAR; INTRAVENOUS at 09:14

## 2020-03-02 RX ADMIN — FENTANYL CITRATE 100 MCG: 50 INJECTION INTRAMUSCULAR; INTRAVENOUS at 09:16

## 2020-03-02 RX ADMIN — HEPARIN SODIUM 6000 UNITS: 1000 INJECTION, SOLUTION INTRAVENOUS; SUBCUTANEOUS at 09:59

## 2020-03-02 RX ADMIN — HEPARIN SODIUM 3000 UNITS: 1000 INJECTION, SOLUTION INTRAVENOUS; SUBCUTANEOUS at 09:44

## 2020-03-02 RX ADMIN — PANTOPRAZOLE SODIUM 40 MG: 40 TABLET, DELAYED RELEASE ORAL at 21:00

## 2020-03-02 RX ADMIN — LIDOCAINE HYDROCHLORIDE 50 MG: 20 INJECTION, SOLUTION EPIDURAL; INFILTRATION; INTRACAUDAL; PERINEURAL at 09:20

## 2020-03-02 RX ADMIN — BUDESONIDE AND FORMOTEROL FUMARATE DIHYDRATE 1 PUFF: 80; 4.5 AEROSOL RESPIRATORY (INHALATION) at 21:28

## 2020-03-02 RX ADMIN — CARVEDILOL 25 MG: 25 TABLET, FILM COATED ORAL at 18:13

## 2020-03-02 RX ADMIN — FUROSEMIDE 20 MG: 10 INJECTION, SOLUTION INTRAMUSCULAR; INTRAVENOUS at 12:03

## 2020-03-02 RX ADMIN — Medication 200 MCG: at 09:53

## 2020-03-02 RX ADMIN — Medication 200 MCG: at 10:29

## 2020-03-02 RX ADMIN — DEXAMETHASONE SODIUM PHOSPHATE 8 MG: 4 INJECTION, SOLUTION INTRAMUSCULAR; INTRAVENOUS at 09:25

## 2020-03-02 RX ADMIN — PROTAMINE SULFATE 40 MG: 10 INJECTION, SOLUTION INTRAVENOUS at 10:24

## 2020-03-02 RX ADMIN — Medication 200 MCG: at 10:21

## 2020-03-02 RX ADMIN — SODIUM CHLORIDE: 9 INJECTION, SOLUTION INTRAVENOUS at 11:36

## 2020-03-02 RX ADMIN — PROTAMINE SULFATE 1 MG: 10 INJECTION, SOLUTION INTRAVENOUS at 10:22

## 2020-03-02 RX ADMIN — SODIUM CHLORIDE: 9 INJECTION, SOLUTION INTRAVENOUS at 09:13

## 2020-03-02 RX ADMIN — SODIUM CHLORIDE, PRESERVATIVE FREE 10 ML: 5 INJECTION INTRAVENOUS at 12:20

## 2020-03-02 RX ADMIN — ROPINIROLE HYDROCHLORIDE 0.5 MG: 1 TABLET, FILM COATED ORAL at 20:59

## 2020-03-02 RX ADMIN — Medication 200 MCG: at 10:19

## 2020-03-02 RX ADMIN — SUGAMMADEX 200 MG: 100 INJECTION, SOLUTION INTRAVENOUS at 10:24

## 2020-03-02 RX ADMIN — FLECAINIDE ACETATE 50 MG: 100 TABLET ORAL at 21:00

## 2020-03-02 RX ADMIN — APIXABAN 5 MG: 5 TABLET, FILM COATED ORAL at 18:13

## 2020-03-02 RX ADMIN — IOPAMIDOL 80 ML: 755 INJECTION, SOLUTION INTRAVENOUS at 10:40

## 2020-03-02 RX ADMIN — VECURONIUM BROMIDE 5 MG: 1 INJECTION, POWDER, LYOPHILIZED, FOR SOLUTION INTRAVENOUS at 09:23

## 2020-03-02 ASSESSMENT — PULMONARY FUNCTION TESTS
PIF_VALUE: 28
PIF_VALUE: 29
PIF_VALUE: 1
PIF_VALUE: 29
PIF_VALUE: 30
PIF_VALUE: 27
PIF_VALUE: 27
PIF_VALUE: 30
PIF_VALUE: 5
PIF_VALUE: 15
PIF_VALUE: 16
PIF_VALUE: 30
PIF_VALUE: 28
PIF_VALUE: 0
PIF_VALUE: 29
PIF_VALUE: 29
PIF_VALUE: 30
PIF_VALUE: 29
PIF_VALUE: 28
PIF_VALUE: 31
PIF_VALUE: 28
PIF_VALUE: 8
PIF_VALUE: 48
PIF_VALUE: 1
PIF_VALUE: 7
PIF_VALUE: 17
PIF_VALUE: 28
PIF_VALUE: 29
PIF_VALUE: 28
PIF_VALUE: 28
PIF_VALUE: 27
PIF_VALUE: 27
PIF_VALUE: 28
PIF_VALUE: 27
PIF_VALUE: 16
PIF_VALUE: 28
PIF_VALUE: 31
PIF_VALUE: 0
PIF_VALUE: 30
PIF_VALUE: 7
PIF_VALUE: 28
PIF_VALUE: 28
PIF_VALUE: 29
PIF_VALUE: 28
PIF_VALUE: 27
PIF_VALUE: 2
PIF_VALUE: 28
PIF_VALUE: 21
PIF_VALUE: 28
PIF_VALUE: 30
PIF_VALUE: 16
PIF_VALUE: 28
PIF_VALUE: 16
PIF_VALUE: 28
PIF_VALUE: 30
PIF_VALUE: 29
PIF_VALUE: 28
PIF_VALUE: 28
PIF_VALUE: 30
PIF_VALUE: 28
PIF_VALUE: 36
PIF_VALUE: 28
PIF_VALUE: 28
PIF_VALUE: 0
PIF_VALUE: 28
PIF_VALUE: 16
PIF_VALUE: 16

## 2020-03-02 ASSESSMENT — PAIN DESCRIPTION - ONSET: ONSET: ON-GOING

## 2020-03-02 ASSESSMENT — ENCOUNTER SYMPTOMS: SHORTNESS OF BREATH: 1

## 2020-03-02 ASSESSMENT — PAIN SCALES - GENERAL
PAINLEVEL_OUTOF10: 0
PAINLEVEL_OUTOF10: 2
PAINLEVEL_OUTOF10: 0
PAINLEVEL_OUTOF10: 5
PAINLEVEL_OUTOF10: 0
PAINLEVEL_OUTOF10: 0

## 2020-03-02 ASSESSMENT — PAIN - FUNCTIONAL ASSESSMENT: PAIN_FUNCTIONAL_ASSESSMENT: ACTIVITIES ARE NOT PREVENTED

## 2020-03-02 ASSESSMENT — PAIN DESCRIPTION - DESCRIPTORS
DESCRIPTORS: ACHING;SORE
DESCRIPTORS: ACHING

## 2020-03-02 ASSESSMENT — PAIN DESCRIPTION - PAIN TYPE
TYPE: CHRONIC PAIN
TYPE: CHRONIC PAIN

## 2020-03-02 ASSESSMENT — PAIN DESCRIPTION - FREQUENCY
FREQUENCY: CONTINUOUS
FREQUENCY: CONTINUOUS

## 2020-03-02 ASSESSMENT — PAIN DESCRIPTION - ORIENTATION
ORIENTATION: MID;LOWER
ORIENTATION: MID

## 2020-03-02 ASSESSMENT — PAIN DESCRIPTION - LOCATION
LOCATION: BACK
LOCATION: BACK

## 2020-03-02 ASSESSMENT — PAIN DESCRIPTION - PROGRESSION: CLINICAL_PROGRESSION: NOT CHANGED

## 2020-03-02 NOTE — OP NOTE
Date of Procedure: 3/2/2020  Patient's Name: Shaquille Peng       Procedure Performed by: Jareth Oliva MD      Procedures performed:     · Percutaneous transcatheter closure of the left atrial appendage with endocardial implant   · Transeptal Puncture  · LEXI      Indication of procedure:  atrial fibrillation, CVA,  High Risk for bleeding  Bleeding Episodes    Patient has been seen by General cardiology and shared decision making has been made for pursuing MAURO closure. Patient here for MAURO closure with Watchman device. Details of procedure: The patient was brought to the electrophysiology laboratory in stable condition. The patient was in a fasting and non-sedated state. The risks, benefits and alternatives of the procedure were discussed with the patient. The risks including, but not limited to, the risks of vascular injury, bleeding, infection, device malfunction, lead dislodgement, radiation exposure, injury to cardiac and surrounding structures (including pneumothorax), stroke, myocardial infarction and death were discussed in detail. The patient opted to proceed with the device implantation. Written informed consent was signed and placed in the chart. Prophylactic antibiotic was given. The patient was prepped and draped in a sterile fashion. A timeout protocol was completed to identify the patient and the procedure being performed. Patient underwent general anesthesia by anesthesiology team.       Transesophageal echocardiography was performed and measurements of left atrial appendage, including ostium size and depth were obtained for selection of appropriate watchman device. Decision was made to use a size 27 mm Watchman device based on LEXI measurement. Both groins were prepped in a sterile fashion. We gained access in both femoral veins. Right femoral access was obtained using modified using modified seldinger technique. Patient received a bolus of heparin prior transseptal puncture.  Transseptal registry. [address] is approved by the [employer] for Medicare and Medicaid Services (CMS) to meet the registry requirements outlined in the national coverage decisions for Percutaneous Left Atrial Appendage Closure     Thank you for letting me participate in this patient's care and please do not hesitate to call me with any questions or concerns.      Nathan Escoto MD, MPH     [facility], [address] [place], [place] [address]JoleneJanak Wilkinson [facility]  Ph: [phone]  Fax: [phone]

## 2020-03-02 NOTE — TELEPHONE ENCOUNTER
3/2/2020 s/p SUCCESSFUL WATCHMAN LAAC PROCEDURE PER DR. Chris Montemayor  successful deployment of left atrial appendage occlusion device with no complication, WATCHMAN device used 27 mm size. --2 week f/u with Selena Muse CNP on 3/17/2020 at [address]  --6 month f/u with Dr. Chris Montemayor on 9/8/2020 at [phone]   --1 year f/u with Dr. Chris Montemayor (unable to schedule d/t 2021 schedule unavailable  All will print on discharge after visit summary. --Nic Thomas,   Post procedure LEXI to be completed 45-59 days post procedure (4/16/2020-4/30/2020) . Order placed. Thanks.    Terry Rivas RN, CCRN-CSC

## 2020-03-02 NOTE — ANESTHESIA PRE PROCEDURE
[facility] Department of Anesthesiology  Pre-Anesthesia Evaluation/Consultation       Name:  Adore Rausch  : 1955  Age:  59 y.o.                                            MRN:  [id number]  Date: 3/2/2020           Surgeon: * No surgeons listed *    Procedure: * No procedures listed *     Allergies   Allergen Reactions    Percocet [Oxycodone-Acetaminophen] Itching     Patient Active Problem List   Diagnosis    [facility] cardiomyopathy ([facility]    Shortness of breath    Essential hypertension, benign    VARUN (obstructive sleep apnea)    History of tobacco use    Atrial fibrillation ([facility]    At risk for amiodarone toxicity with long term use    Tobacco use disorder    HLD (hyperlipidemia)    Dyspnea    Fatigue    Long term current use of amiodarone    HOCM (hypertrophic obstructive cardiomyopathy) (HCC)    Persistent atrial fibrillation    SMITA (acute kidney injury) ([facility]     Past Medical History:   Diagnosis Date    [facility]     new onset    SMITA (acute kidney injury) ([facility] 2019    Cardiomyopathy ([facility]     Chest pain     patent coronaries 06    COPD (chronic obstructive pulmonary disease) ([facility]     Hyperlipidemia     Hypertension     IHSS (idiopathic hypertrophic subaortic stenosis) (HCC)     Echo 6/10 normal LVEF, no mention of IHSS    Mitral insufficiency     echo 6/10 mild-mod MR    VARUN (obstructive sleep apnea)     on CPAP    Palpitations     stable    Tobacco abuse     cessation discussed     Past Surgical History:   Procedure Laterality Date    ATRIAL ABLATION SURGERY      CARDIAC CATHETERIZATION      CARDIOVERSION      ENDOSCOPY, SMALL BOWEL N/A 2019    CAPSULE ENDOSCOPY performed by Kim Alanis MD at [address] HAND SURGERY      HYSTERECTOMY      NERVE BLOCK       Social History     Tobacco Use    Smoking status: Current Every Day Smoker     Packs/day: 0.50     Years: 40.00     Pack years: 20.00    Smokeless tobacco: Never Used   Substance facility-administered medications on file prior to encounter. Current Outpatient Medications   Medication Sig Dispense Refill    cloNIDine (CATAPRES) 0.2 MG tablet Take 1 tablet by mouth nightly 60 tablet 0    furosemide (LASIX) 40 MG tablet Take 0.5 tablets by mouth daily 90 tablet 0    ciprofloxacin (CIPRO) 500 MG tablet Take 1 tablet by mouth 2 times daily for 10 days 20 tablet 0    chlorhexidine (HIBICLENS) 4 % external liquid Wash body from neck down the night before or morning of procedure 1 Bottle 0    flecainide (TAMBOCOR) 50 MG tablet Take 1 tablet by mouth 2 times daily 60 tablet 6    apixaban (ELIQUIS) 5 MG TABS tablet Take 1 tablet by mouth 2 times daily 60 tablet 5    Cholecalciferol (VITAMIN D3) 125 MCG (5000 UT) TABS Take 1 tablet by mouth Daily      polyethylene glycol (GLYCOLAX) packet Take 17 g by mouth daily as needed for Constipation      carvedilol (COREG) 25 MG tablet TAKE 1 TABLET BY MOUTH TWO TIMES DAILY WITH MEALS. 180 tablet 3    Albuterol (VENTOLIN IN) Inhale into the lungs      Budesonide-Formoterol Fumarate (SYMBICORT IN) Inhale into the lungs      rOPINIRole (REQUIP) 0.5 MG tablet Take 0.5 mg by mouth every evening      clotrimazole-betamethasone (LOTRISONE) 1-0.05 % cream Apply topically 2 times daily Apply topically 2 times daily.  cyclobenzaprine (FLEXERIL) 10 MG tablet Take 10 mg by mouth nightly as needed for Muscle spasms      fluticasone (FLONASE) 50 MCG/ACT nasal spray 1 spray by Nasal route daily      gabapentin (NEURONTIN) 100 MG capsule Take 200 mg by mouth nightly. Khloe Kilgore pantoprazole (PROTONIX) 40 MG tablet Take 40 mg by mouth 2 times daily.       diphenhydrAMINE (BENADRYL) 25 MG capsule Take 25 mg by mouth nightly as needed for Itching       Current Facility-Administered Medications   Medication Dose Route Frequency Provider Last Rate Last Dose    sodium chloride flush 0.9 % injection 10 mL  10 mL Intravenous PRN David May MD        ceFAZolin GLUCOSE in the last 72 hours. [facility]    Lab Results   Component Value Date    PROTIME 17.5 02/08/2019    INR 1.54 69/36/0538     HCG (If Applicable) No results found for: PREGTESTUR, PREGSERUM, HCG, HCGQUANT   ABGs No results found for: PHART, PO2ART, FGA1QWD, URB1LSK, BEART, I3RLMGTB   Type & Screen (If Applicable)  No results found for: LABABO, LABRH                         BMI: Wt Readings from Last 3 Encounters:       NPO Status:                          Anesthesia Evaluation  Patient summary reviewed  Airway: Mallampati: III  TM distance: >3 FB   Neck ROM: full  Mouth opening: > = 3 FB Dental:    (+) upper dentures and lower dentures      Pulmonary: breath sounds clear to auscultation  (+) COPD:  shortness of breath:  sleep apnea:      (-) asthma                           Cardiovascular:    (+) hypertension:, valvular problems/murmurs: MR, dysrhythmias: atrial fibrillation, hyperlipidemia    (-) past MI, CAD, CABG/stent and  angina      Rhythm: irregular  Rate: normal                    Neuro/Psych:      (-) seizures, TIA and CVA           GI/Hepatic/Renal:   (+) renal disease: CRI,      (-) GERD, PUD and liver disease       Endo/Other:        (-) diabetes mellitus               Abdominal:           Vascular: negative vascular ROS. Anesthesia Plan      general     ASA 3     (I discussed with the patient the risks and benefits of PIV, general anesthesia, IV Narcotics, PACU. All questions were answered the patient agrees with the plan.)  Induction: intravenous. Anesthetic plan and risks discussed with patient. Plan discussed with CRNA. This pre-anesthesia assessment may be used as a history and physical.    DOS STAFF ADDENDUM:    Pt seen and examined, chart reviewed (including anesthesia, drug and allergy history). No interval changes to history and physical examination.   Anesthetic plan, risks, benefits, alternatives, and personnel involved discussed with patient. Patient verbalized an understanding and agrees to proceed.       Yue Franco MD  March 2, 2020  9:08 AM

## 2020-03-02 NOTE — ANESTHESIA POSTPROCEDURE EVALUATION
BP:106/60, Temp src:Oral, Pulse:73, Resp:17, SpO2:98 %  03/02/20 0725, BP:(!) 149/94, Pulse:62, Resp:16, SpO2:97 %, Height:5' 6\" (1.676 m), Weight:205 lb 0.4 oz (93 kg)     LABS:    CBC  Lab Results       Component                Value               Date/Time                  WBC                      4.2                 02/21/2020 12:05 PM        HGB                      10.2 (L)            02/21/2020 12:05 PM        HCT                      31.6 (L)            02/21/2020 12:05 PM        PLT                      170                 02/21/2020 12:05 PM   RENAL  Lab Results       Component                Value               Date/Time                  NA                       130 (L)             02/21/2020 12:05 PM        K                        4.2                 02/21/2020 12:05 PM        CL                       95 (L)              02/21/2020 12:05 PM        CO2                      22                  02/21/2020 12:05 PM        BUN                      13                  02/21/2020 12:05 PM        CREATININE               1.4 (H)             02/21/2020 12:05 PM        GLUCOSE                  142 (H)             02/21/2020 12:05 PM   COAGS  Lab Results       Component                Value               Date/Time                  PROTIME                  17.5 (H)            02/08/2019 01:14 PM        INR                      1. 54 (H)            02/08/2019 01:14 PM     Intake & Output:  @22XPJO@    Nausea & Vomiting:  No    Level of Consciousness:  Awake    Pain Assessment:  Adequate analgesia    Anesthesia Complications:  No apparent anesthetic complications    SUMMARY      Vital signs stable  OK to discharge from Stage I post anesthesia care.   Care transferred from Anesthesiology department on discharge from perioperative area

## 2020-03-02 NOTE — H&P
Reason for Referral: AFIB, GI bleed with Hgb in the 6, 3 units PRBC, syncope     CC: \"I still feel terrible. \"         Subjective:      History of Present Illness:     Irene Chapin is a 59 y.o. patient with a PMH significant for IHSS, HOCM, diastolic dysfunciton, moderate MR, HTN, COPD, VARUN with CPAP, smoking addiction with persistent AFIB (2014)    DCCV 1/16/19, 2/8/19, AFIB Ablation 5/21/19. 8/28/19 AFlutter per Dr. Jada Jones progress note. Had reported at that time that she passed out early August 2019. Not on amiodarone secondary to lung disease. Schedule for repeat AFIB ablation 3/2020. She was hospitalized 11/6/19 [facility] for 4 syncopal episodes then presented to the ED with complaints of intermittent bleeding with bowel movements. Upon arrival her Hgb in the in the 6 range with 3 units of PRBC transfused.        Today, she presents in follow up s/p 30 day Event monitor,      Past Medical History:   has a past medical history of A-fib ([address] SMITA (acute kidney injury) ([address] Cardiomyopathy ([address] Chest pain, COPD (chronic obstructive pulmonary disease) ([address] Hyperlipidemia, Hypertension, IHSS (idiopathic hypertrophic subaortic stenosis) ([address] Mitral insufficiency, VARUN (obstructive sleep apnea), Palpitations, and Tobacco abuse.     Surgical History:   has a past surgical history that includes Cardiac catheterization; Hysterectomy; Hand surgery; Cardioversion; Atrial ablation surgery; and Endoscopy, small bowel with ileum (N/A, 11/20/2019).    Social History:   reports that she has been smoking. She has a 20.00 pack-year smoking history. She has never used smokeless tobacco. She reports current alcohol use of about 4.0 standard drinks of alcohol per week.  She reports that she does not use drugs.      Family History:  family history includes Heart Disease in her father; Kidney Disease in her father and mother.     Home Medications:  Were reviewed and are listed in nursing record and/or below  Home Medications    Allergies:  Percocet [oxycodone-acetaminophen]         Review of Systems: All reviewed and refer to HPI  · Constitutional: no unanticipated weight loss. There's been no change in energy level, sleep pattern, or activity level. No fevers, chills. · Eyes: No visual changes or diplopia. No scleral icterus. · ENT: No Headaches, hearing loss or vertigo. No mouth sores or sore throat. · Cardiovascular: No Chest pain, tightness or discomfort. · No Shortness of breath.   + Dyspnea on exertion, Orthopnea, Paroxysmal nocturnal dyspnea or breathlessness at rest.  · No Palpitations. · No Syncope ('blackouts', 'faints', 'collapse') or dizziness. · Respiratory: No cough or wheezing, no sputum production. No hematemesis. · Gastrointestinal: No abdominal pain, appetite loss, blood in stools. No change in bowel or bladder habits. · Genitourinary: No dysuria, trouble voiding, or hematuria. · Musculoskeletal:  No gait disturbance, no joint complaints. · Integumentary: No rash or pruritis. · Neurological: No headache, diplopia, change in muscle strength, numbness or tingling. · Psychiatric: No anxiety or depression. · Endocrine: No temperature intolerance. No excessive thirst, fluid intake, or urination. No tremor. · Hematologic/Lymphatic: No abnormal bruising or bleeding, blood clots or swollen lymph nodes. · Allergic/Immunologic: No nasal congestion or hives.        Objective: all reveiwed       PHYSICAL EXAM:      Vitals:    03/02/20 0725   BP: (!) 149/94   Pulse: 62   Resp: 16   SpO2: 97%   Weight: 205 lb 0.4 oz (93 kg)   Height: 5' 6\" (1.676 m)          Vitals       Vitals:     01/24/20 1007   BP: 94/62   Site: Right Upper Arm   Position: Sitting   Pulse: 80   SpO2: 95%   Weight: 203 lb (92.1 kg)   Height: 5' 6\" (1.676 m)                  General Appearance:  Alert, cooperative, no distress, appears stated age. Head:  Normocephalic, without obvious abnormality, atraumatic.    Eyes:  Pupils equal and round. No scleral icterus. Mouth: Moist mucosa, no pharyngeal erythema. Nose: Nares normal. No drainage or sinus tenderness. Neck: Supple, symmetrical, trachea midline. No adenopathy. No tenderness/mass/nodules. No carotid bruit or elevated JVD. Lungs:   Respiratory Effort: Normal   Auscultation: Clear to auscultation bilaterally, respirations unlabored. No wheeze, rales   Chest Wall:  No tenderness or deformity. Cardiovascular:     Pulses  Palpation: normal   Ascultation: Regular rate, S1/ S2 normal. No murmur, rub, or gallop. 2+ radial and pedal pulses, symmetric  Carotid  Femoral   Abdomen and Gastrointestinal:   Soft, non-tender, bowel sounds active. Liver and Spleen  Masses   Musculoskeletal: No muscle wasting  Back  Gait   Extremities: Extremities normal, atraumatic. No cyanosis or edema. No cyanosis clubbing         Skin: Inspection and palpation performed, no rashes or lesions. Pysch: Normal mood and affect.  Alert and oriented to time place person   Neurologic: Normal gross motor and sensory exam.       Labs: all labs have been reviewed             Lab Results   Component Value Date     WBC 5.3 2019     RBC 4.81 2019     HGB 15.6 2019     HCT 44.9 2019     MCV 93.3 2019     RDW 14.3 2019      2019      Lab Results   Component Value Date      2019     K 3.9 2019     CL 98 2019     CO2 26 2019     BUN 27 2019     CREATININE 1.5 2019     CREATININE 1.1 2019     GFRAA 42 2019     LABGLOM 35 2019     GLUCOSE 70 2019     CALCIUM 9.5 2019      No results found for: PTINR  No results found for: LABA1C  No results found for: CKTOTAL, CKMB, CKMBINDEX, TROPONINI     Cardiac, Vascular and Imaging Data: All Personally Reviewed in Detail by Myself       EK19 SB      LEXI: 19  Summary   There is no evidence of mass or thrombus in the left atrium or appendage.     Stress Test:

## 2020-03-03 VITALS
OXYGEN SATURATION: 95 % | BODY MASS INDEX: 32.81 KG/M2 | DIASTOLIC BLOOD PRESSURE: 70 MMHG | TEMPERATURE: 98.1 F | HEIGHT: 66 IN | HEART RATE: 84 BPM | SYSTOLIC BLOOD PRESSURE: 128 MMHG | RESPIRATION RATE: 14 BRPM | WEIGHT: 204.15 LBS

## 2020-03-03 LAB
ANION GAP SERPL CALCULATED.3IONS-SCNC: 16 MMOL/L (ref 3–16)
APTT: 34.1 SEC (ref 24.2–36.2)
BUN BLDV-MCNC: 16 MG/DL (ref 7–20)
CALCIUM SERPL-MCNC: 8.8 MG/DL (ref 8.3–10.6)
CHLORIDE BLD-SCNC: 95 MMOL/L (ref 99–110)
CO2: 23 MMOL/L (ref 21–32)
CREAT SERPL-MCNC: 1.2 MG/DL (ref 0.6–1.2)
GFR AFRICAN AMERICAN: 55
GFR NON-AFRICAN AMERICAN: 45
GLUCOSE BLD-MCNC: 119 MG/DL (ref 70–99)
HCT VFR BLD CALC: 32.3 % (ref 36–48)
HEMOGLOBIN: 10.5 G/DL (ref 12–16)
INR BLD: 1.44 (ref 0.86–1.14)
MCH RBC QN AUTO: 25 PG (ref 26–34)
MCHC RBC AUTO-ENTMCNC: 32.5 G/DL (ref 31–36)
MCV RBC AUTO: 77 FL (ref 80–100)
PDW BLD-RTO: 18.2 % (ref 12.4–15.4)
PLATELET # BLD: 179 K/UL (ref 135–450)
PMV BLD AUTO: 7.2 FL (ref 5–10.5)
POTASSIUM SERPL-SCNC: 4.3 MMOL/L (ref 3.5–5.1)
PROTHROMBIN TIME: 16.8 SEC (ref 10–13.2)
RBC # BLD: 4.19 M/UL (ref 4–5.2)
SODIUM BLD-SCNC: 134 MMOL/L (ref 136–145)
WBC # BLD: 9 K/UL (ref 4–11)

## 2020-03-03 PROCEDURE — 93308 TTE F-UP OR LMTD: CPT

## 2020-03-03 PROCEDURE — 85610 PROTHROMBIN TIME: CPT

## 2020-03-03 PROCEDURE — 36415 COLL VENOUS BLD VENIPUNCTURE: CPT

## 2020-03-03 PROCEDURE — 6370000000 HC RX 637 (ALT 250 FOR IP): Performed by: INTERNAL MEDICINE

## 2020-03-03 PROCEDURE — 93325 DOPPLER ECHO COLOR FLOW MAPG: CPT

## 2020-03-03 PROCEDURE — 2580000003 HC RX 258: Performed by: INTERNAL MEDICINE

## 2020-03-03 PROCEDURE — 80048 BASIC METABOLIC PNL TOTAL CA: CPT

## 2020-03-03 PROCEDURE — 99238 HOSP IP/OBS DSCHRG MGMT 30/<: CPT | Performed by: NURSE PRACTITIONER

## 2020-03-03 PROCEDURE — 85027 COMPLETE CBC AUTOMATED: CPT

## 2020-03-03 PROCEDURE — 85730 THROMBOPLASTIN TIME PARTIAL: CPT

## 2020-03-03 RX ORDER — ASPIRIN 81 MG/1
81 TABLET, CHEWABLE ORAL DAILY
Qty: 30 TABLET | Refills: 3 | Status: SHIPPED | OUTPATIENT
Start: 2020-03-04

## 2020-03-03 RX ADMIN — ASPIRIN 81 MG 81 MG: 81 TABLET ORAL at 08:33

## 2020-03-03 RX ADMIN — SODIUM CHLORIDE, PRESERVATIVE FREE 10 ML: 5 INJECTION INTRAVENOUS at 08:38

## 2020-03-03 RX ADMIN — DIPHENHYDRAMINE HCL 50 MG: 25 TABLET ORAL at 00:11

## 2020-03-03 RX ADMIN — CARVEDILOL 25 MG: 25 TABLET, FILM COATED ORAL at 08:29

## 2020-03-03 RX ADMIN — PANTOPRAZOLE SODIUM 40 MG: 40 TABLET, DELAYED RELEASE ORAL at 08:29

## 2020-03-03 RX ADMIN — FLECAINIDE ACETATE 50 MG: 100 TABLET ORAL at 08:29

## 2020-03-03 RX ADMIN — CIPROFLOXACIN HYDROCHLORIDE 500 MG: 500 TABLET, FILM COATED ORAL at 08:29

## 2020-03-03 RX ADMIN — GABAPENTIN 300 MG: 300 CAPSULE ORAL at 00:11

## 2020-03-03 RX ADMIN — CLONIDINE HYDROCHLORIDE 0.2 MG: 0.1 TABLET ORAL at 00:11

## 2020-03-03 RX ADMIN — APIXABAN 5 MG: 5 TABLET, FILM COATED ORAL at 08:29

## 2020-03-03 ASSESSMENT — PAIN SCALES - GENERAL
PAINLEVEL_OUTOF10: 1
PAINLEVEL_OUTOF10: 0
PAINLEVEL_OUTOF10: 0

## 2020-03-03 ASSESSMENT — PAIN DESCRIPTION - DESCRIPTORS: DESCRIPTORS: ACHING;SORE

## 2020-03-03 ASSESSMENT — PAIN DESCRIPTION - PAIN TYPE: TYPE: CHRONIC PAIN

## 2020-03-03 ASSESSMENT — PAIN DESCRIPTION - LOCATION: LOCATION: BACK

## 2020-03-03 ASSESSMENT — PAIN DESCRIPTION - ORIENTATION: ORIENTATION: MID;LOWER

## 2020-03-03 ASSESSMENT — PAIN DESCRIPTION - FREQUENCY: FREQUENCY: CONTINUOUS

## 2020-03-03 NOTE — PROGRESS NOTES
Assessment completed. Alert and oriented x 4. Afebrile. VS stable. Denies any pain. Voided post tracey cath removal. Sitting on edge of bed eating morning meal. Plan for echo and then possible discharge. R and left groin site clean/dry/intact with no hematoma, neurovasc checks intact.  Will monitor
Handoff report given to Mohit mora.
clots or swollen lymph nodes. · Allergic/Immunologic: No nasal congestion or hives. Objective:   /82   Pulse 71   Temp 98.2 °F (36.8 °C) (Oral)   Resp 14   Ht 5' 6\" (1.676 m)   Wt 204 lb 2.3 oz (92.6 kg)   SpO2 98%   BMI 32.95 kg/m²       Intake/Output Summary (Last 24 hours) at 3/3/2020 0846  Last data filed at 3/3/2020 0510  Gross per 24 hour   Intake 758 ml   Output 2075 ml   Net -1317 ml     Wt Readings from Last 3 Encounters:   03/03/20 204 lb 2.3 oz (92.6 kg)   02/27/20 210 lb (95.3 kg)   01/24/20 203 lb (92.1 kg)       Physical Exam:  General: In no acute distress. Awake, alert, and oriented X 3. Up in room  Skin:  Warm and dry. No new appearing rashes or lesions. Neck:  Supple. No JVD or carotid bruit appreciated. Chest: Lungs clear to auscultation. No wheezes/rhonchi/rales  Cardiovascular:  Slightly irreg; normal S1 and S2; I/VI systolic murmur  Abdomen: soft, nontender, nondistended, +bowel sounds. No hepatomegaly  Extremities:  Trace-1+ bilateral lower leg edema. No clubbing or cyanosis. 2+ bilateral radial/DP/PT pulses. Cap refill brisk. R and L groins without hematoma. L groin with small area of ecchymosis.      Medications:    pantoprazole  40 mg Oral BID    fluticasone  1 spray Nasal Daily    rOPINIRole  0.5 mg Oral Nightly    budesonide-formoterol  1 puff Inhalation BID    flecainide  50 mg Oral BID    apixaban  5 mg Oral BID    ciprofloxacin  500 mg Oral BID    sodium chloride flush  10 mL Intravenous 2 times per day    aspirin  81 mg Oral Daily    cloNIDine  0.2 mg Oral Nightly    carvedilol  25 mg Oral BID WC    gabapentin  300 mg Oral Nightly       perflutren lipid microspheres, sodium chloride flush, acetaminophen, magnesium hydroxide, ondansetron, diphenhydrAMINE    Lab Data:  CBC:   Recent Labs     03/03/20  0503   WBC 9.0   HGB 10.5*        BMP:    Recent Labs     03/03/20  0503   *   K 4.3   CO2 23   BUN 16   CREATININE 1.2     LIVR: No results for

## 2020-03-03 NOTE — DISCHARGE SUMMARY
[address] [place] [address] SUMMARY      Patient ID:  Sebastien Monterroso  [id number] 89 y.o. 1955    Admit date: 3/2/2020    Discharge date:  03/03/2020    Admitting Physician: Megan Trinidad MD     Discharge Physician: Cynthia Ken     Admission Diagnoses: Presence of Watchman left atrial appendage closure device [Z95.818]     2. Paroxysmal atrial fibrillation    Discharge Diagnoses:   1. Paroxysmal atrial fib/flutter  -S/P Watchman LAAC device on 3/2/2020  -poor long term anticoagulation candidate d/t GIB requiring transfusion     2. HOCM  -no gradient mentioned on last echo     3. Nonrheumatic mitral regurgitation  -moderate on last echo     4. Chronic venous insufficiency    Discharged Condition: good    Hospital Course: Sebastien Monterroso was admitted for placement of Watchman LAAC device. She is a 60 y/o female with PMH significant for HOCM, IHSS, diastolic dysfunction, moderate MR, HTN, COPD, VARUN with use of CPAP, persistent atrial fib and GI bleed (with resultant syncope; transfused with 3u PRBC's) and smoking addiction admitted for placement of LAAC device. She is She is S/P DCCV in 1/2019 and 2/2019 and S/P atrial fib ablation in 5/2019 and atrial flutter ablation in 8/2019 (Dr. Stacy Stevenson). She was scheduled for repeat atrial fib ablation in 3/2020, however cancelled d/t her inability to take anticoagulation d/t GIB. On 3/2/2020 she underwent placement of LAAC device (Watchman). Has been progressively ambulated and plan is for discharge today.     Consults:  IP CONSULT TO CARDIAC REHAB    Significant Diagnostic Studies:     3/3/2020 Limited echo: preliminary echo shows no pericardial effusion    3/2/2020 LAAC device:  · Percutaneous transcatheter closure of the left atrial appendage with endocardial implant   · Transeptal Puncture  · LEXI  · 27 mm Watchman device     LEXI pre Watchman 1/21/20   Summary   LVEF is preserved   Mild to moderate MR    MAURO DIMENSIONS:    45 Degree 21 mmx 22 mm   130 degree 22 flecainide (TAMBOCOR) 50 MG tablet  Take 1 tablet by mouth 2 times daily             fluticasone (FLONASE) 50 MCG/ACT nasal spray  1 spray by Nasal route daily             furosemide (LASIX) 40 MG tablet  Take 0.5 tablets by mouth daily             gabapentin (NEURONTIN) 100 MG capsule  Take 200 mg by mouth nightly. .             pantoprazole (PROTONIX) 40 MG tablet  Take 40 mg by mouth 2 times daily. polyethylene glycol (GLYCOLAX) packet  Take 17 g by mouth daily as needed for Constipation             rOPINIRole (REQUIP) 0.5 MG tablet  Take 0.5 mg by mouth every evening                 Follow-up with D. Enzweiler, CNP on 3/17/2020 @9:40 AM    Please see today's progress note for discharge physical exam, meds, follow up.      Signed:  NIC Mckeon CNP on 3/3/2020 at 9:03 AM    [facility], [address], [address] [facility]  Phone: [phone]  Fax: [phone]

## 2020-03-03 NOTE — TELEPHONE ENCOUNTER
[facility] ORTHOPEDIC AND SPINE [facility] with Dr. Livan Lake   The morning of your Procedure-LEXI (transesophageal echocardiogram) you will park in the hospital parking lot and report directly to the cath lab to check in. Come in via the Discharge Bridge. DATE: Thursday 4/23/20  TIME: 11:30 a.m.m   ARRIVAL TIME: 10:30 a.m. Pre-Procedure Instructions:  1. Do not eat or drink anything 8 hours before your procedure. 2. All other medications can be taken in the morning with sips of water. 3. Do not hold anticoagulation therapy: warfarin, eliquis, xarelto, plavix, aspirin therapy. 4. Do not use any lotions, creams or perfume the morning of procedure. 5. Please arrive 1 hour prior to procedure time. 6. Please have a responsible adult to drive you home after procedure. It is recommended you do not drive for 24 hours after procedure. 7. Cath lab will provide you with all post procedure instructions. If you have any questions regarding the procedure itself or medications please call [phone] and ask to speak with a nurse. Published on [employer] and e-mail to Faby Booker.

## 2020-03-16 NOTE — PROGRESS NOTES
[facility]  Office Visit    Rod Oliver  1955 March 17, 2020    CC:   Chief Complaint   Patient presents with    Atrial Fibrillation     s/p watchman/ edema     HPI:  The patient is 59 y.o. female with a past medical history significant for HOCM, IHSS, diastolic dysfunction, moderate MR, HTN, COPD, VARUN with use of CPAP, persistent atrial fib and GI bleed (with resultant syncope-transfused with 3u PRBC's) and smoking addiction who is here for follow up after placement of Watchman LAAC device. She is S/P DCCV in 1/2019 and 2/2019 and S/P atrial fib ablation in 5/2019 and atrial flutter ablation in 8/2019 (Dr. Page Sis). She was scheduled for repeat atrial fib ablation in 3/2020, however cancelled d/t her inability to take anticoagulation d/t GIB. Overall feeling okay. Yesterday had episode of dizziness when she walked back from the bathroom. BP checked and . Has also noted increased edema in her ankles and lower legs bilaterally. Has chronic edema, but seems to be more than usual. Has only been taking 20 mg daily (decreased recently) and since then has noted increased edema. Using CPAP. Has SOBOE (chronic and has had for few years or more). Denies chest pain/discomfort, orthopnea/PND, cough, palpitations, syncope or claudication. Has remained off cigarettes since March 8, 2020. Monitoring sodium and fluid intake. Activity limited by chronic joint pain. Weight 206# this AM (has been as high as 212#)-dry weight 200-203#    Review of Systems:  Constitutional: Denies  fatigue, weakness, night sweats or fever. HEENT: Denies new visual changes, ringing in ears, nosebleeds,nasal congestion  Respiratory: Denies new or change in SOB, PND, orthopnea or cough. Cardiovascular: see HPI  GI: Denies N/V, diarrhea, constipation, abdominal pain, change in bowel habits, melena or hematochezia  : Denies urinary frequency, urgency, incontinence, hematuria or dysuria.   Skin: Denies rash, thyromegaly present. No lymphadenopathy present. Cardiovascular: RRR, normal S1 and S2; I/VI systolic murmur  Pulmonary/Chest: Effort normal.  Lungs clear to auscultation. Chest wall nontender  Abdominal: soft, nontender, nondistended. + bowel sounds; no hepatomegaly   Extremities: No cyanosis, or clubbing. Pulses are 2+ radial/carotid/DP/PT bilaterally. Cap refill brisk. 2+ bilateral ankle/low pretibial edema  Neurological: No focal deficit. Skin: Skin is warm and dry. Psychiatric: She has a normal mood and affect. Her speech is normal and behavior is normal.     Lab Review:   Lab Results   Component Value Date    TRIG 52 03/02/2020    HDL 32 03/02/2020    LDLCALC 88 03/02/2020    LABVLDL 10 03/02/2020     Lab Results   Component Value Date     03/03/2020    K 4.3 03/03/2020    CL 95 03/03/2020    CO2 23 03/03/2020    BUN 16 03/03/2020    CREATININE 1.2 03/03/2020    GLUCOSE 119 03/03/2020    CALCIUM 8.8 03/03/2020      Lab Results   Component Value Date    WBC 9.0 03/03/2020    HGB 10.5 (L) 03/03/2020    HCT 32.3 (L) 03/03/2020    MCV 77.0 (L) 03/03/2020     03/03/2020     3/3/2020 Limited echo   Left ventricular cavity size is normal.   There is asymmetric hypertrophy of the basal septum. Ejection fraction is visually estimated to be 60-65%. No regional wall motion abnormalities are noted.    A 27mm Watchman device was not well visualized, and no leaks were seen by   color Doppler flow.     3/2/2020 LAAC device:  · Percutaneous transcatheter closure of the left atrial appendage with endocardial implant   · Transeptal Puncture  · LEXI  · 27 mm Watchman device     LEXI pre Watchman 1/21/20   Summary   LVEF is preserved   Mild to moderate MR    MAURO DIMENSIONS:    45 Degree 21 mmx 22 mm   130 degree 22 mm x 23 mm     Event monitor 1/2020: SR with episodes of atrial fib  A-fib burden 14%     Echo 1/14/2020:  HCM, LVEF 55-60%, L atrium severely dilated, mod MR, mod pulmonary HTN     10/2017 Lexiscan-Myoview:  Negative for ischemia     6/2006 Angiogram:  Patent coronary arteries    Assessment: 1. Paroxysmal A-fib (HCC)/ Presence of Watchman left atrial appendage closure device  -S/P placement of Watchman LAAC device on 3/2/2020  -poor long term anticoagulation candidate d/t GIB requiring transfusion  -continue Flecainide and carvedilol  -CHADS VASC score 3  HAS BLED 2  -continue ASA and Eliquis for now; plan to reevaluate in 45 days    2. HOCM (hypertrophic obstructive cardiomyopathy) (HCC)  -no gradient mentioned on last echo  -continue BB and BP control    3. Nonrheumatic mitral valve regurgitation  -moderate on last echo    4. Chronic venous insufficiency  -increased edema since procedure  -will increase Lasix and monitor Cr   -ANGELA hose, elevation and low sodium diet    5. Dizziness  -suspect secondary to low BP  -decrease Clonidine with intention to discontinue if BP stable  -gentle diuretic    Plan:  Decrease Clonidine to 0.1 mg at HS x 5 days and then discontinue if SBP controlled (< 140)  Increase Lasix to 40 mg x 3 days and then decrease to 20 mg daily with add'l 20 mg if weight > 203#  Continue Eliquis, ASA, carvedilol, flecainide  Discussed low fat/low sodium diet and reinforced regular aerobic exercise. Check CBC and BMP in 7-10 days  LEXI at 45 days to be scheduled. Advised to follow up with PCP regarding follow up of UTI  Follow up with Dr. Keily Urbina as scheduled in May 2020 or sooner if needed    Return for as scheduled in May 2020 with Dr. Keily Urbina. Thanks for allowing me to participate in the care of this patient.       Fernando Keenan, NIC-CNP  [address], [address] 800 11[facility], [facility] Jolene Janak Feliciano [facility]  Office: [phone]  Fax: [phone]

## 2020-03-17 ENCOUNTER — OFFICE VISIT (OUTPATIENT)
Dept: CARDIOLOGY CLINIC | Age: 65
End: 2020-03-17
Payer: COMMERCIAL

## 2020-03-17 VITALS
WEIGHT: 208 LBS | HEIGHT: 66 IN | BODY MASS INDEX: 33.43 KG/M2 | HEART RATE: 74 BPM | DIASTOLIC BLOOD PRESSURE: 62 MMHG | SYSTOLIC BLOOD PRESSURE: 102 MMHG | OXYGEN SATURATION: 95 %

## 2020-03-17 PROCEDURE — 99214 OFFICE O/P EST MOD 30 MIN: CPT | Performed by: NURSE PRACTITIONER

## 2020-03-17 RX ORDER — CLONIDINE HYDROCHLORIDE 0.2 MG/1
TABLET ORAL
Qty: 60 TABLET | Refills: 0 | Status: SHIPPED | OUTPATIENT
Start: 2020-03-17 | End: 2021-04-12

## 2020-03-17 RX ORDER — FUROSEMIDE 40 MG/1
TABLET ORAL
Qty: 90 TABLET | Refills: 0
Start: 2020-03-17 | End: 2020-09-18

## 2020-03-25 ENCOUNTER — TELEPHONE (OUTPATIENT)
Dept: CARDIOLOGY CLINIC | Age: 65
End: 2020-03-25

## 2020-04-02 ENCOUNTER — TELEPHONE (OUTPATIENT)
Dept: CARDIOLOGY CLINIC | Age: 65
End: 2020-04-02

## 2020-04-02 NOTE — TELEPHONE ENCOUNTER
Larisa Nguyen is calling stating her insurance has sent our office 3 different letters about sending reports for a cath and a 15-30 day event monitor reports and other reports that they are needing in order to pay for services rendered. Larisa Nguyen has called billing and billing told her to call our office. I wasn't sure how to help her. Dates back to Dec 2019.     Larisa Nguyen can be reached at [phone]

## 2020-04-02 NOTE — TELEPHONE ENCOUNTER
This MA faxed over her 30 day event monitor from December, 2019 and OV notes from 12/14/19 to Attn: Celia Pacheco. [phone]

## 2020-04-08 ENCOUNTER — TELEPHONE (OUTPATIENT)
Dept: CARDIOLOGY CLINIC | Age: 65
End: 2020-04-08

## 2020-04-08 NOTE — TELEPHONE ENCOUNTER
Pt states when her BP get high she takes a extra dose of her BP medication. And she have been feeling better doing that. Her Biotel heart monitor showed sinus bradycardia and she wanted to know is there any new recommendations than.

## 2020-04-08 NOTE — TELEPHONE ENCOUNTER
Called Kandy and she is adjusting her Clonidine with increased blood pressures. She is not taking any extra Coreg. She will call with any other questions or concerns.

## 2020-04-17 ENCOUNTER — TELEPHONE (OUTPATIENT)
Dept: CARDIOLOGY CLINIC | Age: 65
End: 2020-04-17

## 2020-04-17 RX ORDER — CLOPIDOGREL BISULFATE 75 MG/1
75 TABLET ORAL DAILY
Qty: 30 TABLET | Refills: 6 | Status: SHIPPED | OUTPATIENT
Start: 2020-04-17 | End: 2020-09-08

## 2020-04-17 NOTE — TELEPHONE ENCOUNTER
Reached     Reached out to inform patient that the post Watchman (3/2/20)  LEXI-transesophageal echocardiogram scheduled with Dr. Sangita Connolly next week on Thursday 4/23/20 at [facility] will be cancelled due to COVID-19 (coronavirus).      Currently on Eliqius 5 mg twice daily and Aspirin 81 mg daily. Has about 1 week left of Eliquis.      Please finish out the remained of your Eliquis. Once your finish Eliquis,  you will then start Plavix 75 mg daily along with the Aspirin 81 mg daily.      Sending the new prescription for Plavix to preferred pharmacy. [facility] pharm.      Placed on Dr. Lopez Almanza procedure rescheduling spreadsheet.      F/u with Dr. Ke Acosta 5/13/20.  Your 6 month follow up with Dr. Sangita Connolly is scheduled for 9/8/20 at 1:15 pm.

## 2020-06-08 ENCOUNTER — TELEPHONE (OUTPATIENT)
Dept: CARDIOLOGY CLINIC | Age: 65
End: 2020-06-08

## 2020-06-08 ENCOUNTER — OFFICE VISIT (OUTPATIENT)
Dept: CARDIOLOGY CLINIC | Age: 65
End: 2020-06-08
Payer: COMMERCIAL

## 2020-06-08 VITALS
TEMPERATURE: 97.9 F | WEIGHT: 210.6 LBS | DIASTOLIC BLOOD PRESSURE: 80 MMHG | OXYGEN SATURATION: 98 % | HEART RATE: 84 BPM | SYSTOLIC BLOOD PRESSURE: 124 MMHG | BODY MASS INDEX: 33.85 KG/M2 | HEIGHT: 66 IN

## 2020-06-08 PROCEDURE — 99214 OFFICE O/P EST MOD 30 MIN: CPT | Performed by: INTERNAL MEDICINE

## 2020-06-08 ASSESSMENT — ENCOUNTER SYMPTOMS
EYE REDNESS: 0
SHORTNESS OF BREATH: 0
BLOOD IN STOOL: 0
WHEEZING: 0
NAUSEA: 0
COUGH: 0

## 2020-06-29 ENCOUNTER — TELEPHONE (OUTPATIENT)
Dept: CARDIOLOGY CLINIC | Age: 65
End: 2020-06-29

## 2020-06-29 NOTE — TELEPHONE ENCOUNTER
Kandy called in stating she was supposed to have a LEXI with Dr. Chantal Alicea but it was canceled due to [place]. She says nobody ever called her back to reschedule, and she would like to reschedule now. Please give Kandy a call at [phone].

## 2020-07-06 NOTE — TELEPHONE ENCOUNTER
Spoke to ROBIN and he said the post watchman LEXI is not needed and will discuss stopping plavix during follow up in September. Called Kandy and let her know this information. She verbalized understanding.

## 2020-07-31 RX ORDER — CARVEDILOL 25 MG/1
TABLET ORAL
Qty: 180 TABLET | Refills: 3 | Status: SHIPPED | OUTPATIENT
Start: 2020-07-31 | End: 2020-11-02 | Stop reason: SDUPTHER

## 2020-09-08 ENCOUNTER — OFFICE VISIT (OUTPATIENT)
Dept: CARDIOLOGY CLINIC | Age: 65
End: 2020-09-08
Payer: COMMERCIAL

## 2020-09-08 VITALS
OXYGEN SATURATION: 100 % | DIASTOLIC BLOOD PRESSURE: 84 MMHG | HEART RATE: 69 BPM | BODY MASS INDEX: 33.33 KG/M2 | TEMPERATURE: 97.3 F | HEIGHT: 66 IN | SYSTOLIC BLOOD PRESSURE: 142 MMHG | WEIGHT: 207.4 LBS

## 2020-09-08 PROCEDURE — 93000 ELECTROCARDIOGRAM COMPLETE: CPT | Performed by: INTERNAL MEDICINE

## 2020-09-08 PROCEDURE — 99213 OFFICE O/P EST LOW 20 MIN: CPT | Performed by: INTERNAL MEDICINE

## 2020-09-08 NOTE — PROGRESS NOTES
[address]  Cardiology Consult    Toya Tillman  1955 September 8, 2020    Primary Cardiologist and Referring Physician[de-identified] Dr. Meme Estevez        Reason for Referral: AFIB, GI bleed with Hgb in the 6, 3 units PRBC, syncope    CC: \"I have been having some pain in my chest when I breath. \"       Subjective:     History of Present Illness:    Toya Tillman is a 59 y.o. patient with a PMH significant for IHSS, HOCM, diastolic dysfunciton, moderate MR, HTN, COPD, VARUN with CPAP, smoking addiction with persistent AFIB (2014) s/p successful DCCV 1/16/19, 2/8/19, AFIB Ablation 5/21/19. 8/28/19 AFlutter per Dr. Dickinson All progress note. Had reported at that time that she passed out early August 2019. Not on amiodarone secondary to lung disease. Schedule for repeat AFIB ablation 3/2020. Today, she is s/p Watchman 3/2/2020. She reports that she has been getting some chest pain with deep breath and cough. Other than that she has been okay. This just started yesterday. Patient denies exertional chest pain/pressure, dyspnea at rest, VELASQUEZ, PND, orthopnea, palpitations, lightheadedness, weight changes, changes in LE edema, and syncope. Past Medical History:   has a past medical history of A-fib ([address] SMITA (acute kidney injury) ([address] Arrhythmia, Cardiomyopathy ([address] Chest pain, COPD (chronic obstructive pulmonary disease) ([address] Hyperlipidemia, Hypertension, IHSS (idiopathic hypertrophic subaortic stenosis) ([address] Mitral insufficiency, VARUN (obstructive sleep apnea), Palpitations, and Tobacco abuse. Surgical History:   has a past surgical history that includes Cardiac catheterization; Hysterectomy; Hand surgery; Cardioversion; Atrial ablation surgery; Endoscopy, small bowel with ileum (N/A, 11/20/2019); Nerve Block; and Cardiac surgery. Social History:   reports that she has been smoking. She has a 20.00 pack-year smoking history.  She has never used smokeless tobacco. She reports current alcohol use of cooperative, no distress, appears stated age. Head:  Normocephalic, without obvious abnormality, atraumatic. Eyes:  Pupils equal and round. No scleral icterus. Mouth: Moist mucosa, no pharyngeal erythema. Nose: Nares normal. No drainage or sinus tenderness. Neck: Supple, symmetrical, trachea midline. No adenopathy. No tenderness/mass/nodules. No carotid bruit or elevated JVD. Lungs:   Respiratory Effort: Normal   Auscultation: Clear to auscultation bilaterally, respirations unlabored. No wheeze, rales   Chest Wall:  No tenderness or deformity. Cardiovascular:    Pulses  Palpation: normal   Ascultation: Regular rate, S1/ S2 normal. No murmur, rub, or gallop. 2+ radial and pedal pulses, symmetric  Carotid  Femoral   Abdomen and Gastrointestinal:   Soft, non-tender, bowel sounds active. Liver and Spleen  Masses   Musculoskeletal: No muscle wasting  Back  Gait   Extremities: Extremities normal, atraumatic. No cyanosis or edema. No cyanosis clubbing       Skin: Inspection and palpation performed, no rashes or lesions. Pysch: Normal mood and affect.  Alert and oriented to time place person   Neurologic: Normal gross motor and sensory exam.       Labs: all labs have been reviewed      Lab Results   Component Value Date    WBC 9.0 2020    RBC 4.19 2020    HGB 10.5 2020    HCT 32.3 2020    MCV 77.0 2020    RDW 18.2 2020     2020     Lab Results   Component Value Date     2020    K 4.3 2020    CL 95 2020    CO2 23 2020    BUN 16 2020    CREATININE 1.2 2020    GFRAA 55 2020    LABGLOM 45 2020    GLUCOSE 119 2020    CALCIUM 8.8 2020     No results found for: PTINR  No results found for: LABA1C  No results found for: CKTOTAL, CKMB, CKMBINDEX, TROPONINI    Cardiac, Vascular and Imaging Data: All Personally Reviewed in Detail by Myself      EK19 SB    2020 Atrial fibrillation    LEXI: 5/21/19  Summary   There is no evidence of mass or thrombus in the left atrium or appendage. Stress Test: 10/217 scanned in     Cath: 2006 normal coronaries       Assessment and Plan     Primary Cardiologist and Referring Physician: Dr. Kristyn Tom   Referring Reason: AFIB, GI bleed with Hgb in the 6, 3 units PRBC, Referral for Consideration of Watchman LAAC. -Atrial Fibrillation,   --Hx of persistent AFIB (2014) s/p successful DCCV 1/16/19, 2/8/19, AFIB Ablation 5/21/19. 8/28/19 AFlutter per Dr. Dawit Gunter progress note. Had reported at that time that she passed out early August 2019. Not on amiodarone secondary to lung disease. Schedule for repeat AFIB ablation 3/2020 with Dr. Betito Camarena  S/p Watchman 3/2020. Discontinue Plavix, continue Asa. Ilana Dust is at least 3 with a 3.2 % per year for sex, HTN, CHF. HASbled is at least 2 with a 4.1 % per year bleeding risk for Prior major bleeding or predisposition to bleeding, Medication usage predisposing to bleeding. Chest pain  With cough and deep breath. Advised her to take Motrin 800 mg to see if this resolves. Diastolic dysfunction  Moderate MR  HTN  Controlled. Will obtain most recent echo   Appears compensated/       Follow up with Dr Kristyn Tom as scheduled/     Thank you for allowing us to participate in the care of [address] Please do not hesitate to contact me if you have any questions. Ward Sepulveda MD, MPH    [place] [address]  Ph: [phone]  Fax: [phone]    This note was scribed in the presence of Dr Darrion Shelton, by La Lovelace RN  Physician Attestation:  The scribes documentation has been prepared under my direction and personally reviewed by me in its entirety. I confirm that the note above accurately reflects all work, treatment, procedures, and medical decision making performed by me.

## 2020-11-02 RX ORDER — CARVEDILOL 25 MG/1
25 TABLET ORAL 2 TIMES DAILY
Qty: 180 TABLET | Refills: 3 | Status: SHIPPED | OUTPATIENT
Start: 2020-11-02 | End: 2022-02-18

## 2020-11-02 RX ORDER — FLECAINIDE ACETATE 50 MG/1
50 TABLET ORAL 2 TIMES DAILY
Qty: 180 TABLET | Refills: 3 | Status: SHIPPED | OUTPATIENT
Start: 2020-11-02 | End: 2021-02-24 | Stop reason: ALTCHOICE

## 2020-11-27 PROBLEM — I50.32 CHRONIC DIASTOLIC CONGESTIVE HEART FAILURE (HCC): Status: ACTIVE | Noted: 2020-11-27

## 2020-11-27 PROBLEM — I34.0 NONRHEUMATIC MITRAL VALVE REGURGITATION: Status: ACTIVE | Noted: 2020-11-27

## 2021-02-18 ENCOUNTER — TELEPHONE (OUTPATIENT)
Dept: CARDIOLOGY | Age: 66
End: 2021-02-18

## 2021-02-18 ENCOUNTER — OFFICE VISIT (OUTPATIENT)
Dept: CARDIOLOGY CLINIC | Age: 66
End: 2021-02-18
Payer: COMMERCIAL

## 2021-02-18 VITALS
TEMPERATURE: 97.3 F | DIASTOLIC BLOOD PRESSURE: 68 MMHG | OXYGEN SATURATION: 97 % | HEART RATE: 65 BPM | HEIGHT: 66 IN | WEIGHT: 197.4 LBS | SYSTOLIC BLOOD PRESSURE: 138 MMHG | BODY MASS INDEX: 31.72 KG/M2

## 2021-02-18 DIAGNOSIS — I10 ESSENTIAL HYPERTENSION: ICD-10-CM

## 2021-02-18 DIAGNOSIS — I42.1 HOCM (HYPERTROPHIC OBSTRUCTIVE CARDIOMYOPATHY) (HCC): ICD-10-CM

## 2021-02-18 DIAGNOSIS — Z95.818 PRESENCE OF WATCHMAN LEFT ATRIAL APPENDAGE CLOSURE DEVICE: ICD-10-CM

## 2021-02-18 DIAGNOSIS — R06.02 SOB (SHORTNESS OF BREATH): ICD-10-CM

## 2021-02-18 DIAGNOSIS — I48.19 PERSISTENT ATRIAL FIBRILLATION (HCC): Primary | ICD-10-CM

## 2021-02-18 PROCEDURE — 93000 ELECTROCARDIOGRAM COMPLETE: CPT | Performed by: NURSE PRACTITIONER

## 2021-02-18 PROCEDURE — 99214 OFFICE O/P EST MOD 30 MIN: CPT | Performed by: NURSE PRACTITIONER

## 2021-02-18 RX ORDER — GLUCOSAMINE/D3/BOSWELLIA SERRA 1500MG-400
TABLET ORAL
COMMUNITY

## 2021-02-18 RX ORDER — PHENOL 1.4 %
AEROSOL, SPRAY (ML) MUCOUS MEMBRANE
COMMUNITY

## 2021-02-18 NOTE — TELEPHONE ENCOUNTER
Discussed with Dr. Vashti Yung: will need echo in post Watchman follow up. Will have  call and arrange echo with Mrs. Darryn Sanchez.

## 2021-02-18 NOTE — PROGRESS NOTES
[address]  Office Visit    Marsha San  1955 February 18, 2021    CC:   Chief Complaint   Patient presents with    Follow-up     SOB/COPD     HPI:  The patient is 72 y.o. female with a past medical history significant for IHSS/HOCM, diastolic dysfunction, moderate MR, HTN, COPD, smoker, VARUN on CPAP and atrial fib with DCCV x 2/AF ablation in May 2019 and March 2020/Atrial flutter ablation 8/2019 and Watchman placement in 3/2/2020 (d/t GI bleed). Has not been on amiodarone d/t prior lung issues with it's use. She was last seen by Dr. Bruno Jefferson in September 2020 and here for annual follow up post Watchman implant. Overall doing okay from cardiac standpoint. Has chronic back pain and under care of pain specialist. Limits her activity significantly. Continues to smoke. Uses walker for ambulation. Has chronic SOBOE which is chronic,  has not worsened and attributes to her smoking and decreased activity. + chronic edema in feet/lower legs by day's end. Wears compression hose. Denies chest pain, palpitations, orthopnea/PND, dizziness. S/P Watchman March 2020. She states Senora Ards called and arranged for LEXI (however, no encounter found in Epic). Uses CPAP nightly    Review of Systems:  Constitutional: Denies  fatigue, weakness, night sweats or fever. HEENT: Denies new visual changes, ringing in ears, nosebleeds,nasal congestion  Respiratory: Denies new or change in SOB, PND, orthopnea or cough. Cardiovascular: see HPI  GI: Denies N/V, diarrhea, constipation, abdominal pain, change in bowel habits, melena or hematochezia  : Denies urinary frequency, urgency, incontinence, hematuria or dysuria. Skin: Denies rash, hives, or cyanosis  Musculoskeletal: + chronic back pain  Neurological: Denies syncope or TIA-like symptoms.   Psychiatric: Denies anxiety, insomnia or depression     Past Medical History:   Diagnosis Date    A-fib [facility]) 2014    new onset  SM[facility]) [facility] 2019    Arrhythmia     Atrial fib and flutter; Watchman LAAC device placed 3/2/2020    Cardiomyopathy ([facility]     Chest pain     patent coronaries 06    Chronic diastolic congestive heart failure ([facility] 2020    COPD (chronic obstructive [facility] disease) ([facility])     Hyperlipidemia     Hypertension     IHSS (idiopathic hypertrophic subaortic stenosis) ([facility])     Echo 6/10 normal LVEF, no mention of IHSS    Mitral insufficiency     echo 6/10 mild-mod MR    VARUN (obstructive sleep apnea)     on CPAP    Palpitations     stable    Tobacco abuse     cessation discussed     Past Surgical History:   Procedure Laterality Date    ATRIAL ABLATION SURGERY      CARDIAC CATHETERIZATION      CARDIAC SURGERY      watchman device    CARDIOVERSION      ENDOSCOPY, SMALL BOWEL N/A 2019    CAPSULE ENDOSCOPY performed by Jose Santillan MD at [address]       Family History   Problem Relation Age of Onset    Kidney Disease Mother     Heart Disease Father     Kidney Disease Father      Social History     Tobacco Use    Smoking status: Current Every Day Smoker     Packs/day: 0.50     Years: 40.00     Pack years: 20.00     Last attempt to quit: 3/8/2020     Years since quittin.9    Smokeless tobacco: Never Used   Substance Use Topics    Alcohol use: Yes     Alcohol/week: 4.0 standard drinks     Types: 3 Shots of liquor, 1 Standard drinks or equivalent per week     Comment: minimal  2-3 glasses per week.     Drug use: No       Allergies   Allergen Reactions    Percocet [Oxycodone-Acetaminophen] Itching     Current Outpatient Medications   Medication Sig Dispense Refill    Biotin 61842 MCG TABS Take by mouth      Melatonin 10 MG TABS Take by mouth      albuterol sulfate  (90 Base) MCG/ACT inhaler Inhale 2 puffs into the lungs every 6 hours as needed  losartan (COZAAR) 50 MG tablet Take 1 tablet by mouth 2 times daily 60 tablet 1    furosemide (LASIX) 40 MG tablet Take 1 tablet by mouth daily 90 tablet 1    carvedilol (COREG) 25 MG tablet Take 1 tablet by mouth 2 times daily 180 tablet 3    flecainide (TAMBOCOR) 50 MG tablet Take 1 tablet by mouth 2 times daily 180 tablet 3    acetaminophen (TYLENOL) 325 MG tablet Take 650 mg by mouth every 6 hours as needed for Pain      cloNIDine (CATAPRES) 0.2 MG tablet Take 0.1 mg nightly x 5 nights and if SBP < 140, discontinue after that (Patient taking differently: Take 0.1 mg nightly x 5 nights and if SBP < 140, as needed) 60 tablet 0    aspirin 81 MG chewable tablet Take 1 tablet by mouth daily 30 tablet 3    Cholecalciferol (VITAMIN D3) 125 MCG (5000 UT) TABS Take 1 tablet by mouth Daily      polyethylene glycol (GLYCOLAX) packet Take 17 g by mouth daily as needed for Constipation      Albuterol (VENTOLIN IN) Inhale into the lungs      Budesonide-Formoterol Fumarate (SYMBICORT IN) Inhale into the lungs      rOPINIRole (REQUIP) 0.5 MG tablet Take 0.5 mg by mouth every evening      clotrimazole-betamethasone (LOTRISONE) 1-0.05 % cream Apply topically 2 times daily Apply topically 2 times daily.  cyclobenzaprine (FLEXERIL) 10 MG tablet Take 10 mg by mouth nightly as needed for Muscle spasms      fluticasone (FLONASE) 50 MCG/ACT nasal spray 1 spray by Nasal route daily      gabapentin (NEURONTIN) 100 MG capsule Take 200 mg by mouth nightly. Aris Hamlin pantoprazole (PROTONIX) 40 MG tablet Take 40 mg by mouth 2 times daily. No current facility-administered medications for this visit.         Physical Exam:   /68   Pulse 65   Temp 97.3 °F (36.3 °C)   Ht 5' 6\" (1.676 m)   Wt 197 lb 6.4 oz (89.5 kg)   LMP  (LMP Unknown)   SpO2 97%   BMI 31.86 kg/m²   Wt Readings from Last 2 Encounters:   02/18/21 197 lb 6.4 oz (89.5 kg)   01/29/21 196 lb (88.9 kg) Constitutional: She is oriented to person, place, and time. She appears well-developed and well-nourished. In no acute distress. HEENT: Normocephalic and atraumatic. Sclerae anicteric. No xanthelasmas. EOM's intact. Neck: Supple. No JVD present. Carotids without bruits. No thyromegaly present. No lymphadenopathy present. Cardiovascular: irreg; normal S1 and S2; soft systolic murmur  Pulmonary/Chest: Effort normal.  Lungs clear to auscultation. Chest wall nontender  Abdominal: soft, nontender, nondistended. + bowel sounds; no hepatomegaly Extremities: No edema, cyanosis, or clubbing. Pulses are 2+ radial/DP/PT bilaterally. Cap refill brisk. Neurological: No focal deficit. Skin: Skin is warm and dry. Psychiatric: She has a normal mood and affect. Her speech is normal and behavior is normal.     Lab Review:   Lab Results   Component Value Date    TRIG 52 03/02/2020    HDL 32 03/02/2020    LDLCALC 88 03/02/2020    LABVLDL 10 03/02/2020     Lab Results   Component Value Date     03/03/2020    K 4.3 03/03/2020    CL 95 03/03/2020    CO2 23 03/03/2020    BUN 16 03/03/2020    CREATININE 1.2 03/03/2020    GLUCOSE 119 03/03/2020    CALCIUM 8.8 03/03/2020      Lab Results   Component Value Date    WBC 9.0 03/03/2020    HGB 10.5 (L) 03/03/2020    HCT 32.3 (L) 03/03/2020    MCV 77.0 (L) 03/03/2020     03/03/2020     ECG 2/18/2021:  Atrial fib with controlled VR; LVH; nonspecific ST-TW changes    Limited echo 3/3/2020:  Left ventricular cavity size is normal.  There is asymmetric hypertrophy of the basal septum. Ejection fraction is visually estimated to be 60-65%. No regional wall motion abnormalities are noted. A 27mm Watchman device was not well visualized, and no leaks were seen by  color Doppler flow.     1/21/2020 LEXI:   LVEF is preserved   Mild to moderate MR      MAURO DIMENSIONS:      45 Degree 21 mmx 22 mm   130 degree 22 mm x 23 mm    10/2017 Stress testing:  See scanned report Cath 2006: Normal coronaries    Assessment:    1. Persistent atrial fibrillation  -remains in atrial fib  -on Flecainide (? Why continuing)  -S/P multiple DCCV's in past  -S/P Watchman LAAC device in March 2020 (no echo at 6 months; ? Echo needed). -S/P Afib/flutter ablation in past with Dr. Angeli Dior  -continue BB  -given her persistent atrial fib will refer back to Dr. Angeli Dior to address Felcainide and any further treatment  -not on Amiodarone secondary to lung disease    2. Essential hypertension  -controlled  -@ goal BP  -continue medical management    3. Presence of Watchman left atrial appendage closure device  -placed in March 2020  -poor long term anticoagulation candidate d/t GI bleed    4. HOCM (hypertrophic obstructive cardiomyopathy) ([address]  -moderate MR  -continue BB  -does not appear to be in CHF on exam    5. SOB (shortness of breath)  -suspect secondary to COPD and continued smoking + deconditioning  -no overt symptoms of CHF  -continue lasix  -smoking cessation emphasized                                                                    Plan:  Continue losartan, lasix, clonidine, carvedilol, ASA  On flecainide but would discontinue given she is back in atrial fib  Discussed low fat/low sodium diet and reinforced regular aerobic exercise. D/W Dr. Karol Pink: will ask for echocardiogram to follow up Dominik  Arrange follow up with Dr. Angeli Dior regarding continuation of Flecainide or change in dosing  Follow up with Dr. Karol Pink as needed if recurrent issues with Delvinman. She follows regularly with Dr. Rosie Molina    Return for Needs appt with Dr. Angeli Dior to discuss recurrent atrial fib and medications; Dr Karol Pink as needed. Thanks for allowing me to participate in the care of this patient.       Sunita Almazan, APRN-CNP  [address], [address]) [place], [address] [place] [address] [facility]  Office: [phone]  Fax: [phone] Electronically signed by NIC Cash CNP on 2/18/2021 at 12:37 PM

## 2021-02-18 NOTE — TELEPHONE ENCOUNTER
Pt is scheduled to get Echo on 2/23 in our [facility] office. She is aware of date/time/instructions.

## 2021-02-23 ENCOUNTER — PROCEDURE VISIT (OUTPATIENT)
Dept: CARDIOLOGY CLINIC | Age: 66
End: 2021-02-23
Payer: COMMERCIAL

## 2021-02-23 DIAGNOSIS — I48.19 PERSISTENT ATRIAL FIBRILLATION (HCC): ICD-10-CM

## 2021-02-23 DIAGNOSIS — I10 ESSENTIAL HYPERTENSION: ICD-10-CM

## 2021-02-23 DIAGNOSIS — Z95.818 PRESENCE OF WATCHMAN LEFT ATRIAL APPENDAGE CLOSURE DEVICE: ICD-10-CM

## 2021-02-23 DIAGNOSIS — I42.1 HOCM (HYPERTROPHIC OBSTRUCTIVE CARDIOMYOPATHY) (HCC): ICD-10-CM

## 2021-02-23 LAB
LV EF: 65 %
LVEF MODALITY: NORMAL

## 2021-02-23 PROCEDURE — 93306 TTE W/DOPPLER COMPLETE: CPT | Performed by: INTERNAL MEDICINE

## 2021-02-24 ENCOUNTER — OFFICE VISIT (OUTPATIENT)
Dept: CARDIOLOGY CLINIC | Age: 66
End: 2021-02-24
Payer: COMMERCIAL

## 2021-02-24 VITALS
HEART RATE: 67 BPM | HEIGHT: 66 IN | OXYGEN SATURATION: 95 % | SYSTOLIC BLOOD PRESSURE: 136 MMHG | DIASTOLIC BLOOD PRESSURE: 84 MMHG | TEMPERATURE: 98 F | WEIGHT: 195 LBS | BODY MASS INDEX: 31.34 KG/M2

## 2021-02-24 DIAGNOSIS — I48.0 PAROXYSMAL A-FIB (HCC): Primary | ICD-10-CM

## 2021-02-24 PROCEDURE — 99214 OFFICE O/P EST MOD 30 MIN: CPT | Performed by: INTERNAL MEDICINE

## 2021-02-24 PROCEDURE — 93000 ELECTROCARDIOGRAM COMPLETE: CPT | Performed by: INTERNAL MEDICINE

## 2021-02-24 RX ORDER — DIGOXIN 125 MCG
125 TABLET ORAL DAILY
Qty: 90 TABLET | Refills: 3 | Status: SHIPPED | OUTPATIENT
Start: 2021-02-24 | End: 2021-04-12

## 2021-02-24 NOTE — PROGRESS NOTES
[address]   Cardiac Electrophysiology Consultation   Date: 2/24/2021  Reason for Consultation: Afib  Consult Requesting Physician: Katt Fang MD  Primary Care Physician: Flaca Marshall MD    Chief Complaint:   Chief Complaint   Patient presents with    Follow-up     afib - SOB      HPI: Michaela Hoskins is a 72 y.o. female with a PMH significant for HOCM and diastolic dysfunction, moderate MR, HTN, COPD, VARUN compliant with CPAP, 0.5-1 PPD smoker, persistent/ long-standing atrial fibrillation (dating back to 2014) who is typically followed by Dr. Jamie Waite for her general cardiac needs. She re-establish care with the EP service on 1/14/19 for consideration of EPS/ RFA for her known Afib. She continued to complain of shortness of breath, fatigue, dizziness and abnormal beats felt predominantly in her neck. She noticed a progression of her shortness of breath over the past year. On 11/1/17, she saw Dr. Gloria Gil for consideration of an atrial fibrillation ablation-- she was scheduled but it was shortly thereafter cancelled due to abnormal CTA results. The results revealed mediastinal lymphadenopathy and ground glass opacities of the lung. A pulmonary consult and bronchoscopy were recommended. She followed up with Dr. Radha Hurtado (pulmonologist at [facility]) who stated that she was stable/ unchanged and no further workup was indicated. She was once again interested in pursuing the ablation. She underwent a successful DCCV on 1/16/19 and was started on Multaq 400 mg BID and her Coreg was decreased. She was founng to be back in A-fib at her follow up visit 2/1/19. She had another cardioversion 2/8/19 and was started on amiodarone which was later discontinued due to decrease lung function.  She subsequently underwent a atrial fibrillation ablation on 5/21/19 Interval history: Today, she is in the office to discuss her medications especially as it relates to continued use of AAD when persisting in atrial fibrillation. She continues to be in atrial fibrillation despite her medications. She has not decided who she is going to follow with for general cardiology. Patient denies exertional chest pain/pressure, dyspnea at rest, VELASQUEZ, PND, orthopnea, palpitations, lightheadedness, weight changes, changes in LE edema, and syncope. Past Medical History:   Diagnosis Date    [facility]-fib [facility]) 2014    new onset    SMITA (acute kidney injury) ([facility] 12/6/2019    Arrhythmia     Atrial fib and flutter; Watchman LAAC device placed 3/2/2020    [facility]     Chest pain     patent coronaries 06    Chronic diastolic congestive heart failure ([facility] 11/27/2020    COPD [facility]ch[facility] obstructive [facility] disease) ([facility])     Hyperlipidemia     Hypertension     IHSS (idiopathic hypertrophic subaortic stenosis) ([facility])     Echo 6/10 normal LVEF, no mention of IHSS    Mitral insufficiency     echo 6/10 mild-mod MR    VARUN (obstructive sleep apnea)     on CPAP    Palpitations     stable    Tobacco abuse     cessation discussed        Past Surgical History:   Procedure Laterality Date    ATRIAL ABLATION SURGERY      CARDIAC CATHETERIZATION      CARDIAC SURGERY      watchman device    CARDIOVERSION      ENDOSCOPY, SMALL BOWEL N/A 11/20/2019    CAPSULE ENDOSCOPY performed by Marion Waterman MD at [address] BLOCK         Allergies: Allergies   Allergen Reactions    Percocet [Oxycodone-Acetaminophen] Itching       Medication:   Prior to Admission medications    Medication Sig Start Date End Date Taking?  Authorizing Provider   Biotin 56971 MCG TABS Take by mouth   Yes Historical Provider, MD   Melatonin 10 MG TABS Take by mouth   Yes Historical Provider, MD albuterol sulfate  (90 Base) MCG/ACT inhaler Inhale 2 puffs into the lungs every 6 hours as needed 1/14/21  Yes Historical Provider, MD   losartan (COZAAR) 50 MG tablet Take 1 tablet by mouth 2 times daily 1/29/21  Yes Dominique Herrera MD   furosemide (LASIX) 40 MG tablet Take 1 tablet by mouth daily 11/12/20  Yes Dominique Herrera MD   carvedilol (COREG) 25 MG tablet Take 1 tablet by mouth 2 times daily 11/2/20  Yes Katt Fang MD   flecainide (TAMBOCOR) 50 MG tablet Take 1 tablet by mouth 2 times daily 11/2/20  Yes Katt Fang MD   acetaminophen (TYLENOL) 325 MG tablet Take 650 mg by mouth every 6 hours as needed for Pain   Yes Historical Provider, MD   cloNIDine (CATAPRES) 0.2 MG tablet Take 0.1 mg nightly x 5 nights and if SBP < 140, discontinue after that  Patient taking differently: Take 0.1 mg nightly x 5 nights and if SBP < 140, as needed 3/17/20  Yes NIC Mancuso CNP   aspirin 81 MG chewable tablet Take 1 tablet by mouth daily 3/4/20  Yes NIC Mancuso CNP   Cholecalciferol (VITAMIN D3) 125 MCG (5000 UT) TABS Take 1 tablet by mouth Daily   Yes Historical Provider, MD   polyethylene glycol (GLYCOLAX) packet Take 17 g by mouth daily as needed for Constipation   Yes Historical Provider, MD   Albuterol (VENTOLIN IN) Inhale into the lungs   Yes Historical Provider, MD   Budesonide-Formoterol Fumarate (SYMBICORT IN) Inhale into the lungs   Yes Historical Provider, MD   rOPINIRole (REQUIP) 0.5 MG tablet Take 0.5 mg by mouth every evening   Yes Historical Provider, MD   clotrimazole-betamethasone (LOTRISONE) 1-0.05 % cream Apply topically 2 times daily Apply topically 2 times daily.    Yes Historical Provider, MD   cyclobenzaprine (FLEXERIL) 10 MG tablet Take 10 mg by mouth nightly as needed for Muscle spasms   Yes Historical Provider, MD   fluticasone (FLONASE) 50 MCG/ACT nasal spray 1 spray by Nasal route daily   Yes Historical Provider, MD gabapentin (NEURONTIN) 100 MG capsule Take 200 mg by mouth nightly. .   Yes Historical Provider, MD   pantoprazole (PROTONIX) 40 MG tablet Take 40 mg by mouth 2 times daily. Yes Historical Provider, MD       Social History:   reports that she has been smoking. She has a 20.00 pack-year smoking history. She has never used smokeless tobacco. She reports current alcohol use of about 4.0 standard drinks of alcohol per week. She reports that she does not use drugs. Family History:  family history includes Heart Disease in her father; Kidney Disease in her father and mother. Reviewed. Denies family history of sudden cardiac death, arrhythmia, premature CAD    Review of System:    · General ROS: negative for - chills, fever   · Psychological ROS: negative for - anxiety or depression  · Ophthalmic ROS: negative for - eye pain or loss of vision  · ENT ROS: negative for - epistaxis, headaches, nasal discharge, sore throat   · Allergy and Immunology ROS: negative for - hives, nasal congestion   · Hematological and Lymphatic ROS: negative for - bleeding problems, blood clots, bruising or jaundice  · Endocrine ROS: negative for - skin changes, temperature intolerance or unexpected weight changes  · Respiratory ROS: negative for - cough, hemoptysis, pleuritic pain, sputum changes or wheezing + chronic shortness of breath. · Cardiovascular ROS: Per HPI.    · Gastrointestinal ROS: negative for - abdominal pain, blood in stools, diarrhea, hematemesis, melena, na usea/vomiting or  swallowing difficulty/pain +obese  · Genito-Urinary ROS: negative for - dysuria or incontinence  · Musculoskeletal ROS: negative for - joint swelling or muscle pain  · Neurological ROS: negative for - confusion, dizziness, gait disturbance, headaches, numbness/tingling,  seizures, speech problems, tremors, visual changes or weakness  · Dermatological ROS: negative for - rash    Physical Examination:  Vitals:    02/24/21 1117   BP: 136/84 Pulse: 67   Temp: 98 °F (36.7 °C)   SpO2: 95%       · Constitutional: Oriented. No distress. · Head: Normocephalic and atraumatic. · Mouth/Throat: Oropharynx is clear and moist.   · Eyes: Conjunctivae normal. EOM are normal.   · Neck: Normal range of motion. Neck supple. No rigidity. No JVD present. · Cardiovascular: Irregular, S1&S2 and intact distal pulses. · Pulmonary/Chest: Bilateral respiratory sounds. No wheezes. No rhonchi. · Abdominal: Soft. Bowel sounds present. No distension, No tenderness. · Musculoskeletal: No tenderness. No edema    · Lymphadenopathy: Has no cervical adenopathy. · Neurological: Alert and oriented. Cranial nerve appears intact, No Gross deficit   · Skin: Skin is warm and dry. No rash noted. · Psychiatric: Has a normal mood, affect and behavior     Labs:  Reviewed. ECG: reviewed, 8/28/19 Atrial fibrillation v-rate 61 bpm.    Studies:   1. Event monitor: 4/2012 [facility])  NSR    2. 48 Holter Monitor: 10/2017 [facility])  Symptomatic Afib HR     3. Echo: 1/9/19 ([facility])  LVEF 60-65%, hypertrophic CM  Severe concentric hypertrophy of LV  LA volume 43.2 ml/m, LA 28.9 cm  IVsd 1.89 cm    4. Stress Test:  10/2017 [facility])  Normal scan, non-diagnostic EKG    5. Cath: 2006  Patent coronaries    The MCOT, echocardiogram, stress test, and coronary angiography/PCI were reviewed by myself and used for my plan of care. Procedures:  1. Successful DCCV 3/2014  2. Successful DCCV 10/2014  3. Successful DCCV on 1/16/19  4. Successful DCCV on 2/8/19  5. Atrial fibrillation ablation on 5/21/19    Assessment/Plan:     HOCM:  -Reported by ECHO  -On medical management  -IVsd 1.89 cm  -On Coreg 25 mg BID     Syncope  Reported one episode while shopping at [employer].   Instructed to stay well hydrated drinking at least 10-12 cups of water a day and to increase water intake to 12-15 cups during vigorous physical activity     Smoking cessation Patient expresses interest in cessation. Reports her family doctor Dr. Miller Cox gave her a RX for Chantix and she wanted to make sure it was ok to take. Advised there are not contraindication to her taking Chantix. Afib:  -Permanent, long-standing history  -Symptomatic, reports fatigue   -Likely secondary to HOCM  S/p atrial fibrillation ablation on 5/21/19  -S/p DCCV on 2/8/19 (success was short lived). Per EKG, in atrial fibrillation today.  -not a re-ablation candidate given how voluminous her LA size is, and how she has not responded to 2 previous atrial fibrillation ablations. Discontinue Flecainide and initiate Digoxin 125 mcg daily for rate control. Will get EKG and digoxin level 2 days after starting medication. Follow up with primary cardiology as scheduled. Follow-up with EP prn. Thank you for allowing me to participate in the care of [address] All questions and concerns were addressed to the patient/family. Alternatives to my treatment were discussed. This note was scribed in the presence of Dr Chris Adan, by Lakia Cardoso RN. The scribe's documentation has been prepared under my direction and personally reviewed by me in its entirety. I confirm that the note above accurately reflects all work, physical examination, the discussion of treatments and procedures, and medical decision making performed by me.     Chris Adan MD, MS, [facility]  Cardiac Electrophysiology  [address]  [address] [facility], [address] [place] [address]  [phone]

## 2021-03-04 ENCOUNTER — NURSE ONLY (OUTPATIENT)
Dept: CARDIOLOGY CLINIC | Age: 66
End: 2021-03-04
Payer: COMMERCIAL

## 2021-03-04 ENCOUNTER — TELEPHONE (OUTPATIENT)
Dept: CARDIOLOGY CLINIC | Age: 66
End: 2021-03-04

## 2021-03-04 DIAGNOSIS — I42.9 PRIMARY CARDIOMYOPATHY (HCC): Primary | ICD-10-CM

## 2021-03-04 DIAGNOSIS — I50.32 CHRONIC DIASTOLIC CONGESTIVE HEART FAILURE (HCC): ICD-10-CM

## 2021-03-04 DIAGNOSIS — I48.19 PERSISTENT ATRIAL FIBRILLATION (HCC): ICD-10-CM

## 2021-03-04 DIAGNOSIS — Z79.899 LONG TERM CURRENT USE OF AMIODARONE: Chronic | ICD-10-CM

## 2021-03-04 PROCEDURE — 93000 ELECTROCARDIOGRAM COMPLETE: CPT | Performed by: INTERNAL MEDICINE

## 2021-03-04 NOTE — TELEPHONE ENCOUNTER
Called patient no answer left message instruction to call regarding EKG. Need to remind patient to complete digoxin level as well when she returns her call.

## 2021-03-04 NOTE — TELEPHONE ENCOUNTER
EKG from today shows atrial fibrillation/atrial flutter with v-rates 85 bpm. Well rate controlled. Nothing further to do except checking a digoxin level. If not toxic range, nothing further to do.

## 2021-03-05 NOTE — TELEPHONE ENCOUNTER
Received fax Digxin level 0.3 ng/ml. 2/24/21 o.v. flecainide discontinue and digoxin 125 mcg daily initiated.

## 2021-03-29 NOTE — PROGRESS NOTES
Watchman implant 3/2020 after GI bleed. Today, she states that she has felt \"out of breath lately\" and wonders if her symptoms are related to taking digoxin. She is also receiving JEFF for her back pain. She continue to report intermittent palpitations described as a racing heart beat. She acknowledges her dyspnea may be related to her continued smoking but she is not ready to quit. Patient denies exertional chest pain/pressure, PND, orthopnea, palpitations, lightheadedness, weight changes, changes in LE edema, and syncope. She is compliant with her medications. She takes clonidine prn for her elevated blood pressure but says it is infrequent. Past Medical History:   Diagnosis Date    [facility]fib [facility])     Asymmetric septal hypertrophy (HCC)     Cardiomy[facility] ([facility]     Chest pain     patent coronaries 06    Chronic diastolic congestive heart failure ([facility] 2020    CKD (chronic kidney disease)     COPD (chronic obstructive pulmonary disease) (HCC)     Hyperlipidemia     Hypertension     Mitral insufficiency     VARUN (obstructive sleep apnea)     on CPAP    Tobacco abuse     cessation discussed     Past Surgical History:   Procedure Laterality Date    ATRIAL ABLATION SURGERY      CARDIAC CATHETERIZATION      CARDIAC SURGERY      watchman device    CARDIOVERSION      ENDOSCOPY, SMALL BOWEL N/A 2019    CAPSULE ENDOSCOPY performed by Jasmin Brown MD at [address] HAND SURGERY      HYSTERECTOMY      NERVE BLOCK       Family History   Problem Relation Age of Onset    Kidney Disease Mother     Heart Disease Father     Kidney Disease Father      Social History     Tobacco Use    Smoking status: Current Every Day Smoker     Packs/day: 0.50     Years: 40.00     Pack years: 20.00     Last attempt to quit: 3/8/2020     Years since quittin.0    Smokeless tobacco: Never Used   Substance Use Topics    Alcohol use:  Yes     Alcohol/week: 4.0 standard drinks     Types: 3 Shots of liquor, 1 Standard drinks or equivalent per week     Comment: minimal  2-3 glasses per week.  Drug use: No       Allergies   Allergen Reactions    Percocet [Oxycodone-Acetaminophen] Itching     Current Outpatient Medications   Medication Sig Dispense Refill    Biotin 03085 MCG TABS Take by mouth      Melatonin 10 MG TABS Take by mouth      albuterol sulfate  (90 Base) MCG/ACT inhaler Inhale 2 puffs into the lungs every 6 hours as needed      losartan (COZAAR) 50 MG tablet Take 1 tablet by mouth 2 times daily 60 tablet 1    furosemide (LASIX) 40 MG tablet Take 1 tablet by mouth daily 90 tablet 1    carvedilol (COREG) 25 MG tablet Take 1 tablet by mouth 2 times daily 180 tablet 3    acetaminophen (TYLENOL) 325 MG tablet Take 650 mg by mouth every 6 hours as needed for Pain      aspirin 81 MG chewable tablet Take 1 tablet by mouth daily 30 tablet 3    Cholecalciferol (VITAMIN D3) 125 MCG (5000 UT) TABS Take 1 tablet by mouth Daily      polyethylene glycol (GLYCOLAX) packet Take 17 g by mouth daily as needed for Constipation      Albuterol (VENTOLIN IN) Inhale into the lungs      Budesonide-Formoterol Fumarate (SYMBICORT IN) Inhale into the lungs      rOPINIRole (REQUIP) 0.5 MG tablet Take 0.5 mg by mouth every evening      clotrimazole-betamethasone (LOTRISONE) 1-0.05 % cream Apply topically 2 times daily Apply topically 2 times daily.  cyclobenzaprine (FLEXERIL) 10 MG tablet Take 10 mg by mouth nightly as needed for Muscle spasms      fluticasone (FLONASE) 50 MCG/ACT nasal spray 1 spray by Nasal route daily      gabapentin (NEURONTIN) 100 MG capsule Take 200 mg by mouth nightly. Clair Newman pantoprazole (PROTONIX) 40 MG tablet Take 40 mg by mouth 2 times daily. No current facility-administered medications for this visit. Review of Systems:  · Constitutional: No unanticipated weight loss. There's been no change in energy level, sleep pattern, or activity level.  No fevers, GI bleed. Rate control strategy recommended. Continue BBlocker. Will stop digoxin in view of bradycardia. 2) Asymmetric septal hypertrophy/Chronic diastolic heart failure. Continue attempts at BP control. No gradient or MAGDA reported on echo. 3) Essential hypertension. Controlled. Goal BP <130/80. Will stop prn clonidine. Advised to continue to monitor BP at home and call if SBP >140, would then prescribe Norvasc 5mg daily. 4) Chronic dyspnea. Likely multifactorial with chronic lung disease, VARUN, diastolic dysfx, smoking, and deconditioning. 5) VARUN. Compliant with CPAP. 6) CKD. Stage IIIa. Followed by nephrology. 7) Nicotine Addiction. Encouraged smoking cessation but patient is in the contemplative stage. F/u in office in 6 months    Thank you very much for allowing me to participate in the care of your patient. Please do not hesitate to contact me if you have any questions. Sincerely,  Emir Fox. Rasta Young, [address] [place] [address]  Ph: [phone]  Fax: [phone]    This note was scribed in the presence of the physician by Sherman Perez RN. Physician Attestation: The scribes documentation has been prepared under my direction and personally reviewed by me in its entirety. I confirm that the note above accurately reflects all work, treatment, procedures, and medical decision making performed by me. All portions of the note including but not limited to the chief complaint, history of present illness, physical exam, assessment and plan/medical decision making were personally reviewed, edited, and updated on the day of the visit.

## 2021-04-12 ENCOUNTER — OFFICE VISIT (OUTPATIENT)
Dept: CARDIOLOGY CLINIC | Age: 66
End: 2021-04-12
Payer: COMMERCIAL

## 2021-04-12 VITALS
HEIGHT: 66 IN | HEART RATE: 72 BPM | DIASTOLIC BLOOD PRESSURE: 84 MMHG | BODY MASS INDEX: 31.5 KG/M2 | TEMPERATURE: 97.1 F | WEIGHT: 196 LBS | OXYGEN SATURATION: 96 % | SYSTOLIC BLOOD PRESSURE: 124 MMHG

## 2021-04-12 DIAGNOSIS — G47.33 OSA (OBSTRUCTIVE SLEEP APNEA): ICD-10-CM

## 2021-04-12 DIAGNOSIS — I50.32 CHRONIC DIASTOLIC CONGESTIVE HEART FAILURE (HCC): ICD-10-CM

## 2021-04-12 DIAGNOSIS — I10 ESSENTIAL HYPERTENSION: ICD-10-CM

## 2021-04-12 DIAGNOSIS — F17.218 CIGARETTE NICOTINE DEPENDENCE WITH OTHER NICOTINE-INDUCED DISORDER: ICD-10-CM

## 2021-04-12 DIAGNOSIS — I48.21 PERMANENT ATRIAL FIBRILLATION (HCC): Primary | ICD-10-CM

## 2021-04-12 DIAGNOSIS — I42.2 ASYMMETRIC SEPTAL HYPERTROPHY (HCC): ICD-10-CM

## 2021-04-12 PROCEDURE — 93000 ELECTROCARDIOGRAM COMPLETE: CPT | Performed by: INTERNAL MEDICINE

## 2021-04-12 PROCEDURE — 99214 OFFICE O/P EST MOD 30 MIN: CPT | Performed by: INTERNAL MEDICINE

## 2021-04-12 NOTE — PATIENT INSTRUCTIONS

## 2021-06-10 ENCOUNTER — TELEPHONE (OUTPATIENT)
Dept: NEPHROLOGY | Age: 66
End: 2021-06-10

## 2021-06-10 NOTE — TELEPHONE ENCOUNTER
Called to check on blood pressure   BP was running high with headache.   Pt inc dose back to Losartan 50 mg bid    Now BP 130s/80s    PLAN:  Recheck BMP next wk  Last K 5.3    Yasmine Vela MD

## 2021-11-15 ENCOUNTER — CLINICAL DOCUMENTATION (OUTPATIENT)
Dept: OTHER | Age: 66
End: 2021-11-15

## (undated) DEVICE — CAPSULE ENDOSCP L26.2MM DIA11.4MM BIOCOMPATIBLE PLAS SB 3